# Patient Record
Sex: FEMALE | Race: WHITE | NOT HISPANIC OR LATINO | URBAN - METROPOLITAN AREA
[De-identification: names, ages, dates, MRNs, and addresses within clinical notes are randomized per-mention and may not be internally consistent; named-entity substitution may affect disease eponyms.]

---

## 2022-11-24 ENCOUNTER — EMERGENCY (EMERGENCY)
Facility: HOSPITAL | Age: 52
LOS: 0 days | Discharge: HOME | End: 2022-11-24
Attending: EMERGENCY MEDICINE | Admitting: EMERGENCY MEDICINE

## 2022-11-24 VITALS
DIASTOLIC BLOOD PRESSURE: 90 MMHG | SYSTOLIC BLOOD PRESSURE: 148 MMHG | RESPIRATION RATE: 17 BRPM | TEMPERATURE: 100 F | HEART RATE: 112 BPM | WEIGHT: 210.1 LBS | OXYGEN SATURATION: 98 %

## 2022-11-24 VITALS
TEMPERATURE: 99 F | SYSTOLIC BLOOD PRESSURE: 140 MMHG | DIASTOLIC BLOOD PRESSURE: 84 MMHG | RESPIRATION RATE: 17 BRPM | OXYGEN SATURATION: 96 % | HEART RATE: 98 BPM

## 2022-11-24 DIAGNOSIS — J06.9 ACUTE UPPER RESPIRATORY INFECTION, UNSPECIFIED: ICD-10-CM

## 2022-11-24 DIAGNOSIS — R05.1 ACUTE COUGH: ICD-10-CM

## 2022-11-24 DIAGNOSIS — R50.9 FEVER, UNSPECIFIED: ICD-10-CM

## 2022-11-24 DIAGNOSIS — R09.81 NASAL CONGESTION: ICD-10-CM

## 2022-11-24 DIAGNOSIS — Z87.448 PERSONAL HISTORY OF OTHER DISEASES OF URINARY SYSTEM: ICD-10-CM

## 2022-11-24 DIAGNOSIS — R06.2 WHEEZING: ICD-10-CM

## 2022-11-24 PROCEDURE — 99284 EMERGENCY DEPT VISIT MOD MDM: CPT

## 2022-11-24 PROCEDURE — 71045 X-RAY EXAM CHEST 1 VIEW: CPT | Mod: 26

## 2022-11-24 RX ORDER — DEXAMETHASONE 0.5 MG/5ML
10 ELIXIR ORAL ONCE
Refills: 0 | Status: COMPLETED | OUTPATIENT
Start: 2022-11-24 | End: 2022-11-24

## 2022-11-24 RX ORDER — IPRATROPIUM/ALBUTEROL SULFATE 18-103MCG
3 AEROSOL WITH ADAPTER (GRAM) INHALATION ONCE
Refills: 0 | Status: COMPLETED | OUTPATIENT
Start: 2022-11-24 | End: 2022-11-24

## 2022-11-24 RX ORDER — ACETAMINOPHEN 500 MG
975 TABLET ORAL ONCE
Refills: 0 | Status: COMPLETED | OUTPATIENT
Start: 2022-11-24 | End: 2022-11-24

## 2022-11-24 RX ADMIN — Medication 3 MILLILITER(S): at 13:54

## 2022-11-24 NOTE — ED PROVIDER NOTE - CLINICAL SUMMARY MEDICAL DECISION MAKING FREE TEXT BOX
52-year-old female presenting to ED for cough concerning for viral illness.  Chest x-ray within normal limits.  Lungs clear to auscultation.  COVID flu test pending.  DC him strict return precautions.

## 2022-11-24 NOTE — ED PROVIDER NOTE - NSFOLLOWUPINSTRUCTIONS_ED_ALL_ED_FT
Continue your antibiotics until finished.     Fever    A fever is an increase in the body's temperature above 100.4°F (38°C) or higher. In adults and children older than three months, a brief mild or moderate fever generally has no long-term effect, and it usually does not require treatment. Many times, fevers are the result of viral infections, which are self-resolving.  However, certain symptoms or diagnostic tests may suggest a bacterial infection that may respond to antibiotics. Take medications as directed by your health care provider.    SEEK IMMEDIATE MEDICAL CARE IF YOU OR YOUR CHILD HAVE ANY OF THE FOLLOWING SYMPTOMS : shortness of breath, seizure, rash/stiff neck/headache, severe abdominal pain, persistent vomiting, any signs of dehydration, or if your child has a fever for over five (5) days.    Shortness of breath    Shortness of breath (dyspnea) means you have trouble breathing and could indicate a medical problem. Causes include lung disease, heart disease, low amount of red blood cells (anemia), poor physical fitness, being overweight, smoking, etc. Your health care provider today may not be able to find a cause for your shortness of breath after your exam. In this case, it is important to have a follow-up exam with your primary care physician as instructed. If medicines were prescribed, take them as directed for the full length of time directed. Refrain from tobacco products.    SEEK IMMEDIATE MEDICAL CARE IF YOU HAVE ANY OF THE FOLLOWING SYMPTOMS: worsening shortness of breath, chest pain, back pain, abdominal pain, fever, coughing up blood, lightheadedness/dizziness.

## 2022-11-24 NOTE — ED PROVIDER NOTE - PATIENT PORTAL LINK FT
You can access the FollowMyHealth Patient Portal offered by [facility] by registering at the following website: [email or web address]. By joining [facility]’s FollowMyHealth portal, you will also be able to view your health information using other applications (apps) compatible with our system.

## 2022-11-24 NOTE — ED PROVIDER NOTE - PHYSICAL EXAMINATION
CONSTITUTIONAL: nontoxic appearing, in no acute distress  HEAD:  normocephalic, atraumatic  EYES:  no conjunctival injection, no eye discharge, tracking well  ENT:  tympanic membranes intact bilaterally, moist mucous membranes, no oropharyngeal ulcerations or lesions, no tonsillar swelling or erythema, no tonsillar exudates  NECK:  supple, no masses, no tender anterior/posterior cervical lymphadenopathy  CV:  regular rate and rhythm, cap refill < 2 seconds  RESP:  normal respiratory effort, mild end expiratory wheezes in apices b/l; otherwise clear exam   ABD:  soft, nontender, nondistended, no masses, no organomegaly  LYMPH:  no significant lymphadenopathy  MSK/NEURO:  normal movement, normal tone  SKIN:  warm, dry, no rash

## 2022-11-24 NOTE — ED PROVIDER NOTE - ATTENDING CONTRIBUTION TO CARE
52 y F presents to ED for fever cough and congestion. Pt went to urgent care for these symptoms 11 days ago and received amoxicillin. Pt was still having symptoms so she went to her PMD who changed her antibiotics to Augmentin however she states she is still coughing. No CP, nausea, vomiting, abdominal pain, diarrhea.     Const: NAD  Eyes: PERRL, no conjunctival injection  HENT:  Neck supple without meningismus   CV: RRR, Warm, well-perfused extremities  RESP: CTA B/L, no tachypnea   GI: soft, non-tender, non-distended  MSK: No gross deformities appreciated  Skin: Warm, dry. No rashes  Neuro: Alert, CNs II-XII grossly intact. Sensation and motor function of extremities grossly intact.  Psych: Appropriate mood and affect.

## 2022-11-24 NOTE — ED PROVIDER NOTE - OBJECTIVE STATEMENT
52 y F Nepalese speaking only pmhx of atrophic kidney 2/2 previous infection c/o fever, cough, congestion. pt states that since Sunday (11d ago) she has had cough, congestion, and fever; pt went to UC and was prescribed abx but felt that after 4d she was not significantly improving; pt states that she went back to PMD and had her abx changed to augmentin; pt has been compliant w/ medications but states that her cough has persisted. pt denies sob, cp, difficulty tolerating PO.

## 2024-04-24 PROBLEM — Z00.00 ENCOUNTER FOR PREVENTIVE HEALTH EXAMINATION: Status: ACTIVE | Noted: 2024-04-24

## 2024-05-20 ENCOUNTER — APPOINTMENT (OUTPATIENT)
Dept: ORTHOPEDIC SURGERY | Facility: CLINIC | Age: 54
End: 2024-05-20
Payer: COMMERCIAL

## 2024-05-20 VITALS — WEIGHT: 210 LBS | BODY MASS INDEX: 32.96 KG/M2 | HEIGHT: 67 IN

## 2024-05-20 DIAGNOSIS — Z78.9 OTHER SPECIFIED HEALTH STATUS: ICD-10-CM

## 2024-05-20 DIAGNOSIS — M17.32 UNILATERAL POST-TRAUMATIC OSTEOARTHRITIS, LEFT KNEE: ICD-10-CM

## 2024-05-20 DIAGNOSIS — M17.11 UNILATERAL PRIMARY OSTEOARTHRITIS, RIGHT KNEE: ICD-10-CM

## 2024-05-20 DIAGNOSIS — I10 ESSENTIAL (PRIMARY) HYPERTENSION: ICD-10-CM

## 2024-05-20 PROCEDURE — 73522 X-RAY EXAM HIPS BI 3-4 VIEWS: CPT

## 2024-05-20 PROCEDURE — 73564 X-RAY EXAM KNEE 4 OR MORE: CPT | Mod: 50

## 2024-05-20 PROCEDURE — 99203 OFFICE O/P NEW LOW 30 MIN: CPT

## 2024-05-20 RX ORDER — HYALURONATE SODIUM 16.8MG/2ML
16.8 SYRINGE (ML) INTRAARTICULAR
Qty: 6 | Refills: 0 | Status: COMPLETED | COMMUNITY
Start: 2024-05-20 | End: 2024-05-26

## 2024-05-20 NOTE — PAST MEDICAL HISTORY
[TextEntry] : Chronic renal insufficiency.  It stems from congenital issues as well as history of infection of the kidney.

## 2024-05-20 NOTE — HISTORY OF PRESENT ILLNESS
[Constant] : ~He/She~ states the symptoms seem to be constant [Bending] : worsened by bending [Walking] : worsened by walking [Recumbency] : relieved by recumbency [Rest] : relieved by rest [de-identified] : 53-year-old lady presents for evaluation of pain in her knees, more pronounced on the left side.  She reports constant pain associated with loss of motion.  Her changes in gait also aggravate pain in the right knee and both hips.  She reports the pain gets worse with sitting, standing, and stairs as well as walking.  Rest and extending the leg sometimes make it better, but even at rest pain can be severe.  She rates pain as severe also with walking and at night.  She can walk 6-10 blocks with a cane and has difficulty putting on shoes and socks.  She has no problems using public transportation.  She has pain going up and down the stairs, and sometimes with sitting down pain started immediately.  She has been doing physical therapy, and had multiple courses of injection.  She had both hyaluronic acid and steroid injections.  Last steroid injection was done in November 2023.  She has history of arthroscopic meniscectomy of the left knee in 2015. [de-identified] : standing

## 2024-05-20 NOTE — DISCUSSION/SUMMARY
[de-identified] : All these findings were discussed with Ms. Rowejosue. We discussed her x-ray and severe osteoarthritis.  At this point she is not fully ready for total knee arthroplasty.  She will try another course of viscosupplementation, given that she had improvement in the past.  Injections were ordered, and patient will follow-up when viscosupplementation is delivered.

## 2024-05-20 NOTE — PHYSICAL EXAM
[de-identified] : Patient walks with significant limp.  Right knee demonstrates full range of motion from full extension to 120 degrees of flexion.  There is no instability.  There is crepitus on patellar grind test.  There is tenderness to palpation over the patellofemoral articulation and medial joint line. Left knee demonstrates severe crepitus on patellar grind test.  There is significant global tenderness palpation.  There is a 15 degree flexion contracture and patient cannot flex beyond 110 degrees.  There is no instability.  Varus is more pronounced on the left side and is only partially correctable.  There is slight effusion.  Hip exam is unremarkable bilaterally.  I [de-identified] :  bilateral hip and knee xrays were obtained in office today show: No significant osteoarthritis of bilateral hips. Right knee demonstrates significant osteoarthritic changes with decrease of joint space medially.  There is osteophyte formation. Left knee demonstrates severe osteoarthritis.  There is complete loss of joint space in the medial compartment with bone-on-bone articulation.  There is significant osteophyte formation also involving the lateral compartment, and patellofemoral compartment.

## 2024-07-26 ENCOUNTER — APPOINTMENT (OUTPATIENT)
Dept: ORTHOPEDIC SURGERY | Facility: CLINIC | Age: 54
End: 2024-07-26
Payer: COMMERCIAL

## 2024-07-26 PROCEDURE — 20610 DRAIN/INJ JOINT/BURSA W/O US: CPT | Mod: 50

## 2024-07-26 NOTE — PROCEDURE
[de-identified] :   Discussed again with the patient the planned orthovisc injection. The risks, benefits, convalescence and alternatives were reviewed. The possible side effects discussed included but were not limited to pain, swelling, heat and redness. There symptoms are generally mild but if they are extensive then contact the office.   After discussion of the risks and benefits, the patient agreed to their orthovisc injection into BILATERAL knees. Confirmed that the patient does not have a history of prior adverse reactions, active infections or relevant allergies. There was no effusion, erythema or warmth present, and the skin was clear. The skin was sterilized with alcohol. Topical anesthesia was achieved with ethyl chloride. Chlorhexidine was applied. A 22-gauge needle was inserted into the RIGHT knee via a lateral approach without ultrasound guidance. The injection was completed without complication and a bandage was applied.  The same procedure was repeated for the LEFT side.   The patient tolerated the procedure well and was instructed to avoid strenuous activities for the next 24-48 hours and to use ice, NSAIDs, or Tylenol for pain as needed. The patient will call immediately with any signs of infection or allergic reaction. LOT:[id number] EXP:06/06/2026

## 2024-08-02 ENCOUNTER — APPOINTMENT (OUTPATIENT)
Dept: ORTHOPEDIC SURGERY | Facility: CLINIC | Age: 54
End: 2024-08-02
Payer: COMMERCIAL

## 2024-08-02 PROCEDURE — 20610 DRAIN/INJ JOINT/BURSA W/O US: CPT | Mod: 50

## 2024-08-02 NOTE — PROCEDURE
[de-identified] :   Discussed again with the patient the planned orthovisc injection. The risks, benefits, convalescence and alternatives were reviewed. The possible side effects discussed included but were not limited to pain, swelling, heat and redness. There symptoms are generally mild but if they are extensive then contact the office.    After discussion of the risks and benefits, the patient agreed to their orthovisc injection into BILATERAL knees. Confirmed that the patient does not have a history of prior adverse reactions, active infections or relevant allergies. There was no effusion, erythema or warmth present, and the skin was clear. The skin was sterilized with alcohol. Topical anesthesia was achieved with ethyl chloride. Chlorhexidine was applied. A 22-gauge needle was inserted into the RIGHT knee via a lateral approach without ultrasound guidance. The injection was completed without complication and a bandage was applied.  The same procedure was repeated for the LEFT side.   The patient tolerated the procedure well and was instructed to avoid strenuous activities for the next 24-48 hours and to use ice, NSAIDs, or Tylenol for pain as needed. The patient will call immediately with any signs of infection or allergic reaction.   LOT:[id number] EXP: 06/06/2026

## 2024-08-09 ENCOUNTER — APPOINTMENT (OUTPATIENT)
Dept: ORTHOPEDIC SURGERY | Facility: CLINIC | Age: 54
End: 2024-08-09

## 2024-08-09 PROCEDURE — 20610 DRAIN/INJ JOINT/BURSA W/O US: CPT | Mod: 50

## 2024-08-09 NOTE — PROCEDURE
[de-identified] : Discussed again with the patient the planned orthovisc injection. The risks, benefits, convalescence and alternatives were reviewed. The possible side effects discussed included but were not limited to pain, swelling, heat and redness. There symptoms are generally mild but if they are extensive then contact the office.  After discussion of the risks and benefits, the patient agreed to their orthovisc injection into BILATERAL knees. Confirmed that the patient does not have a history of prior adverse reactions, active infections or relevant allergies. There was no effusion, erythema or warmth present, and the skin was clear. The skin was sterilized with alcohol. Topical anesthesia was achieved with ethyl chloride. Chlorhexidine was applied. A 22-gauge needle was inserted into the RIGHT knee via a lateral approach without ultrasound guidance. The injection was completed without complication and a bandage was applied.  The same procedure was repeated for the LEFT side.  The patient tolerated the procedure well and was instructed to avoid strenuous activities for the next 24-48 hours and to use ice, NSAIDs, or Tylenol for pain as needed. The patient will call immediately with any signs of infection or allergic reaction. LOT          [id number]          EXP:  2026-06-06

## 2024-08-09 NOTE — PROCEDURE
[de-identified] : Discussed again with the patient the planned orthovisc injection. The risks, benefits, convalescence and alternatives were reviewed. The possible side effects discussed included but were not limited to pain, swelling, heat and redness. There symptoms are generally mild but if they are extensive then contact the office.  After discussion of the risks and benefits, the patient agreed to their orthovisc injection into BILATERAL knees. Confirmed that the patient does not have a history of prior adverse reactions, active infections or relevant allergies. There was no effusion, erythema or warmth present, and the skin was clear. The skin was sterilized with alcohol. Topical anesthesia was achieved with ethyl chloride. Chlorhexidine was applied. A 22-gauge needle was inserted into the RIGHT knee via a lateral approach without ultrasound guidance. The injection was completed without complication and a bandage was applied.  The same procedure was repeated for the LEFT side.  The patient tolerated the procedure well and was instructed to avoid strenuous activities for the next 24-48 hours and to use ice, NSAIDs, or Tylenol for pain as needed. The patient will call immediately with any signs of infection or allergic reaction. LOT          [id number]          EXP:  2026-06-06

## 2025-02-25 ENCOUNTER — LABORATORY RESULT (OUTPATIENT)
Age: 55
End: 2025-02-25

## 2025-02-25 ENCOUNTER — APPOINTMENT (OUTPATIENT)
Dept: UROLOGY | Facility: CLINIC | Age: 55
End: 2025-02-25
Payer: COMMERCIAL

## 2025-02-25 VITALS
DIASTOLIC BLOOD PRESSURE: 113 MMHG | RESPIRATION RATE: 18 BRPM | BODY MASS INDEX: 31.39 KG/M2 | HEIGHT: 67 IN | SYSTOLIC BLOOD PRESSURE: 177 MMHG | HEART RATE: 79 BPM | WEIGHT: 200 LBS | OXYGEN SATURATION: 79 %

## 2025-02-25 DIAGNOSIS — N26.1 ATROPHY OF KIDNEY (TERMINAL): ICD-10-CM

## 2025-02-25 DIAGNOSIS — N20.0 CALCULUS OF KIDNEY: ICD-10-CM

## 2025-02-25 DIAGNOSIS — R10.2 PELVIC AND PERINEAL PAIN: ICD-10-CM

## 2025-02-25 PROCEDURE — G2211 COMPLEX E/M VISIT ADD ON: CPT | Mod: NC

## 2025-02-25 PROCEDURE — 99204 OFFICE O/P NEW MOD 45 MIN: CPT

## 2025-02-25 RX ORDER — PHENAZOPYRIDINE HYDROCHLORIDE 200 MG/1
200 TABLET ORAL 3 TIMES DAILY
Qty: 21 | Refills: 1 | Status: ACTIVE | COMMUNITY
Start: 2025-02-25 | End: 1900-01-01

## 2025-02-27 ENCOUNTER — NON-APPOINTMENT (OUTPATIENT)
Age: 55
End: 2025-02-27

## 2025-03-06 ENCOUNTER — NON-APPOINTMENT (OUTPATIENT)
Age: 55
End: 2025-03-06

## 2025-03-06 LAB — BACTERIA UR CULT: ABNORMAL

## 2025-04-05 ENCOUNTER — RESULT REVIEW (OUTPATIENT)
Age: 55
End: 2025-04-05

## 2025-04-05 ENCOUNTER — OUTPATIENT (OUTPATIENT)
Dept: OUTPATIENT SERVICES | Facility: HOSPITAL | Age: 55
LOS: 1 days | End: 2025-04-05
Payer: COMMERCIAL

## 2025-04-05 DIAGNOSIS — N20.0 CALCULUS OF KIDNEY: ICD-10-CM

## 2025-04-05 DIAGNOSIS — Z00.8 ENCOUNTER FOR OTHER GENERAL EXAMINATION: ICD-10-CM

## 2025-04-05 PROCEDURE — 74176 CT ABD & PELVIS W/O CONTRAST: CPT | Mod: 26

## 2025-04-05 PROCEDURE — 74176 CT ABD & PELVIS W/O CONTRAST: CPT

## 2025-04-06 DIAGNOSIS — N20.0 CALCULUS OF KIDNEY: ICD-10-CM

## 2025-04-10 ENCOUNTER — APPOINTMENT (OUTPATIENT)
Dept: UROLOGY | Facility: CLINIC | Age: 55
End: 2025-04-10
Payer: COMMERCIAL

## 2025-04-10 DIAGNOSIS — R82.90 UNSPECIFIED ABNORMAL FINDINGS IN URINE: ICD-10-CM

## 2025-04-10 DIAGNOSIS — N26.1 ATROPHY OF KIDNEY (TERMINAL): ICD-10-CM

## 2025-04-10 DIAGNOSIS — N20.0 CALCULUS OF KIDNEY: ICD-10-CM

## 2025-04-10 PROCEDURE — 81003 URINALYSIS AUTO W/O SCOPE: CPT | Mod: QW

## 2025-04-10 PROCEDURE — 99214 OFFICE O/P EST MOD 30 MIN: CPT | Mod: 25

## 2025-04-15 ENCOUNTER — NON-APPOINTMENT (OUTPATIENT)
Age: 55
End: 2025-04-15

## 2025-04-15 LAB
BILIRUB UR QL STRIP: NORMAL
COLLECTION METHOD: NORMAL
GLUCOSE UR-MCNC: NORMAL
HCG UR QL: 0.2 EU/DL
HGB UR QL STRIP.AUTO: NORMAL
KETONES UR-MCNC: NORMAL
LEUKOCYTE ESTERASE UR QL STRIP: NORMAL
NITRITE UR QL STRIP: NORMAL
PH UR STRIP: 6
PROT UR STRIP-MCNC: NORMAL
SP GR UR STRIP: 1.01

## 2025-05-16 ENCOUNTER — APPOINTMENT (OUTPATIENT)
Dept: ORTHOPEDIC SURGERY | Facility: CLINIC | Age: 55
End: 2025-05-16
Payer: COMMERCIAL

## 2025-05-16 DIAGNOSIS — M17.32 UNILATERAL POST-TRAUMATIC OSTEOARTHRITIS, LEFT KNEE: ICD-10-CM

## 2025-05-16 PROCEDURE — 73564 X-RAY EXAM KNEE 4 OR MORE: CPT | Mod: LT

## 2025-05-16 PROCEDURE — 99214 OFFICE O/P EST MOD 30 MIN: CPT | Mod: 57

## 2025-05-21 ENCOUNTER — NON-APPOINTMENT (OUTPATIENT)
Age: 55
End: 2025-05-21

## 2025-06-04 ENCOUNTER — RESULT REVIEW (OUTPATIENT)
Age: 55
End: 2025-06-04

## 2025-06-04 ENCOUNTER — OUTPATIENT (OUTPATIENT)
Dept: OUTPATIENT SERVICES | Facility: HOSPITAL | Age: 55
LOS: 1 days | End: 2025-06-04
Payer: COMMERCIAL

## 2025-06-04 VITALS
SYSTOLIC BLOOD PRESSURE: 149 MMHG | RESPIRATION RATE: 18 BRPM | HEIGHT: 72 IN | HEART RATE: 65 BPM | OXYGEN SATURATION: 98 % | DIASTOLIC BLOOD PRESSURE: 91 MMHG | TEMPERATURE: 98 F | WEIGHT: 214.95 LBS

## 2025-06-04 DIAGNOSIS — M17.12 UNILATERAL PRIMARY OSTEOARTHRITIS, LEFT KNEE: ICD-10-CM

## 2025-06-04 DIAGNOSIS — Z98.890 OTHER SPECIFIED POSTPROCEDURAL STATES: Chronic | ICD-10-CM

## 2025-06-04 DIAGNOSIS — Z01.818 ENCOUNTER FOR OTHER PREPROCEDURAL EXAMINATION: ICD-10-CM

## 2025-06-04 LAB
A1C WITH ESTIMATED AVERAGE GLUCOSE RESULT: 6.1 % — HIGH (ref 4–5.6)
ALBUMIN SERPL ELPH-MCNC: 4.5 G/DL — SIGNIFICANT CHANGE UP (ref 3.5–5.2)
ALP SERPL-CCNC: 113 U/L — SIGNIFICANT CHANGE UP (ref 30–115)
ALT FLD-CCNC: 14 U/L — SIGNIFICANT CHANGE UP (ref 0–41)
ANION GAP SERPL CALC-SCNC: 10 MMOL/L — SIGNIFICANT CHANGE UP (ref 7–14)
APTT BLD: 30.7 SEC — SIGNIFICANT CHANGE UP (ref 27–39.2)
AST SERPL-CCNC: 23 U/L — SIGNIFICANT CHANGE UP (ref 0–41)
BASOPHILS # BLD AUTO: 0.04 K/UL — SIGNIFICANT CHANGE UP (ref 0–0.2)
BASOPHILS NFR BLD AUTO: 0.8 % — SIGNIFICANT CHANGE UP (ref 0–1)
BILIRUB SERPL-MCNC: 0.6 MG/DL — SIGNIFICANT CHANGE UP (ref 0.2–1.2)
BLD GP AB SCN SERPL QL: SIGNIFICANT CHANGE UP
BUN SERPL-MCNC: 14 MG/DL — SIGNIFICANT CHANGE UP (ref 10–20)
CALCIUM SERPL-MCNC: 9.6 MG/DL — SIGNIFICANT CHANGE UP (ref 8.4–10.5)
CHLORIDE SERPL-SCNC: 105 MMOL/L — SIGNIFICANT CHANGE UP (ref 98–110)
CO2 SERPL-SCNC: 25 MMOL/L — SIGNIFICANT CHANGE UP (ref 17–32)
CREAT SERPL-MCNC: 0.9 MG/DL — SIGNIFICANT CHANGE UP (ref 0.7–1.5)
EGFR: 76 ML/MIN/1.73M2 — SIGNIFICANT CHANGE UP
EGFR: 76 ML/MIN/1.73M2 — SIGNIFICANT CHANGE UP
EOSINOPHIL # BLD AUTO: 0.17 K/UL — SIGNIFICANT CHANGE UP (ref 0–0.7)
EOSINOPHIL NFR BLD AUTO: 3.5 % — SIGNIFICANT CHANGE UP (ref 0–8)
ESTIMATED AVERAGE GLUCOSE: 128 MG/DL — HIGH (ref 68–114)
GLUCOSE SERPL-MCNC: 115 MG/DL — HIGH (ref 70–99)
HCT VFR BLD CALC: 40 % — SIGNIFICANT CHANGE UP (ref 37–47)
HGB BLD-MCNC: 13.3 G/DL — SIGNIFICANT CHANGE UP (ref 12–16)
IMM GRANULOCYTES NFR BLD AUTO: 0.2 % — SIGNIFICANT CHANGE UP (ref 0.1–0.3)
INR BLD: 0.88 RATIO — SIGNIFICANT CHANGE UP (ref 0.65–1.3)
LYMPHOCYTES # BLD AUTO: 1.82 K/UL — SIGNIFICANT CHANGE UP (ref 1.2–3.4)
LYMPHOCYTES # BLD AUTO: 37.4 % — SIGNIFICANT CHANGE UP (ref 20.5–51.1)
MCHC RBC-ENTMCNC: 29.4 PG — SIGNIFICANT CHANGE UP (ref 27–31)
MCHC RBC-ENTMCNC: 33.3 G/DL — SIGNIFICANT CHANGE UP (ref 32–37)
MCV RBC AUTO: 88.5 FL — SIGNIFICANT CHANGE UP (ref 81–99)
MONOCYTES # BLD AUTO: 0.41 K/UL — SIGNIFICANT CHANGE UP (ref 0.1–0.6)
MONOCYTES NFR BLD AUTO: 8.4 % — SIGNIFICANT CHANGE UP (ref 1.7–9.3)
MRSA PCR RESULT.: NEGATIVE — SIGNIFICANT CHANGE UP
NEUTROPHILS # BLD AUTO: 2.41 K/UL — SIGNIFICANT CHANGE UP (ref 1.4–6.5)
NEUTROPHILS NFR BLD AUTO: 49.7 % — SIGNIFICANT CHANGE UP (ref 42.2–75.2)
NRBC BLD AUTO-RTO: 0 /100 WBCS — SIGNIFICANT CHANGE UP (ref 0–0)
PLATELET # BLD AUTO: 199 K/UL — SIGNIFICANT CHANGE UP (ref 130–400)
PMV BLD: 10.6 FL — HIGH (ref 7.4–10.4)
POTASSIUM SERPL-MCNC: 4.2 MMOL/L — SIGNIFICANT CHANGE UP (ref 3.5–5)
POTASSIUM SERPL-SCNC: 4.2 MMOL/L — SIGNIFICANT CHANGE UP (ref 3.5–5)
PROT SERPL-MCNC: 6.9 G/DL — SIGNIFICANT CHANGE UP (ref 6–8)
PROTHROM AB SERPL-ACNC: 10.3 SEC — SIGNIFICANT CHANGE UP (ref 9.95–12.87)
RBC # BLD: 4.52 M/UL — SIGNIFICANT CHANGE UP (ref 4.2–5.4)
RBC # FLD: 12.3 % — SIGNIFICANT CHANGE UP (ref 11.5–14.5)
SODIUM SERPL-SCNC: 140 MMOL/L — SIGNIFICANT CHANGE UP (ref 135–146)
WBC # BLD: 4.86 K/UL — SIGNIFICANT CHANGE UP (ref 4.8–10.8)
WBC # FLD AUTO: 4.86 K/UL — SIGNIFICANT CHANGE UP (ref 4.8–10.8)

## 2025-06-04 PROCEDURE — 73562 X-RAY EXAM OF KNEE 3: CPT | Mod: 26,LT

## 2025-06-04 PROCEDURE — 99214 OFFICE O/P EST MOD 30 MIN: CPT | Mod: 25

## 2025-06-04 PROCEDURE — 86901 BLOOD TYPING SEROLOGIC RH(D): CPT

## 2025-06-04 PROCEDURE — 85025 COMPLETE CBC W/AUTO DIFF WBC: CPT

## 2025-06-04 PROCEDURE — 73562 X-RAY EXAM OF KNEE 3: CPT | Mod: LT

## 2025-06-04 PROCEDURE — 85730 THROMBOPLASTIN TIME PARTIAL: CPT

## 2025-06-04 PROCEDURE — 83036 HEMOGLOBIN GLYCOSYLATED A1C: CPT

## 2025-06-04 PROCEDURE — 86850 RBC ANTIBODY SCREEN: CPT

## 2025-06-04 PROCEDURE — 80053 COMPREHEN METABOLIC PANEL: CPT

## 2025-06-04 PROCEDURE — 36415 COLL VENOUS BLD VENIPUNCTURE: CPT

## 2025-06-04 PROCEDURE — 93005 ELECTROCARDIOGRAM TRACING: CPT

## 2025-06-04 PROCEDURE — 85610 PROTHROMBIN TIME: CPT

## 2025-06-04 PROCEDURE — 93010 ELECTROCARDIOGRAM REPORT: CPT

## 2025-06-04 PROCEDURE — 72170 X-RAY EXAM OF PELVIS: CPT | Mod: 26

## 2025-06-04 PROCEDURE — 86900 BLOOD TYPING SEROLOGIC ABO: CPT

## 2025-06-04 PROCEDURE — 72170 X-RAY EXAM OF PELVIS: CPT

## 2025-06-04 PROCEDURE — 87641 MR-STAPH DNA AMP PROBE: CPT

## 2025-06-04 PROCEDURE — 87640 STAPH A DNA AMP PROBE: CPT

## 2025-06-04 RX ORDER — ACETAMINOPHEN 500 MG/5ML
1000 LIQUID (ML) ORAL ONCE
Refills: 0 | Status: COMPLETED | OUTPATIENT
Start: 2025-06-19 | End: 2025-06-19

## 2025-06-04 RX ORDER — CELECOXIB 50 MG/1
400 CAPSULE ORAL ONCE
Refills: 0 | Status: COMPLETED | OUTPATIENT
Start: 2025-06-19 | End: 2025-06-19

## 2025-06-04 NOTE — H&P PST ADULT - NSANTHOSAYNRD_GEN_A_CORE
No. KAMRAN screening performed.  STOP BANG Legend: 0-2 = LOW Risk; 3-4 = INTERMEDIATE Risk; 5-8 = HIGH Risk

## 2025-06-04 NOTE — H&P PST ADULT - MUSCULOSKELETAL
ROM intact/normal gait/strength 5/5 bilateral upper extremities/strength 5/5 bilateral lower extremities ROM intact/decreased ROM due to pain/strength 5/5 bilateral upper extremities

## 2025-06-04 NOTE — H&P PST ADULT - HISTORY OF PRESENT ILLNESS
PATIENT CURRENTLY DENIES CHEST PAIN  SHORTNESS OF BREATH  PALPITATIONS,  DYSURIA, OR UPPER RESPIRATORY INFECTION IN PAST 2 WEEKS  54 YEARS OLD FEMALE C/O RIGHT KNEE PAIN, PAIN IS SELF LIMITING, HAS BEEN TREATED WITH MEDICATIONS, PT BUT DID NOT HELP, HERE TODAY FOR SCHEDULED SURGERY ABOVE.    Denies travel outside the [place] in the past 30 days  Patient denies any signs or symptoms of COVID 19 and denies contact with known positive individuals.         Anesthesia Alert  YES--Difficult Airway CLASS IV  NO--History of neck surgery or radiation  NO--Limited ROM of neck  NO--History of Malignant hyperthermia  NO--No personal or family history of Pseudocholinesterase deficiency.  NO--Prior Anesthesia Complication  NO--Latex Allergy  NO--Loose teeth  NO--History of Rheumatoid Arthritis  NO--Bleeding risk  NO--KAMRAN  NO--Other_____    DASI  5.29    RCRI 0    PT DENIES ANY RASHES, ABRASION, OR OPEN WOUNDS OR CUTS    AS PER THE PT, THIS IS HIS/HER COMPLETE MEDICAL AND SURGICAL HX, INCLUDING MEDICATIONS PRESCRIBED AND OVER THE COUNTER    Patient/Guardian understands the instructions and was given the opportunity to ask questions and have them answered.    Opioid Risk Assessment Tool (Female)       Family history of substance abuse            Alcohol (1)  0            Illegal Drugs (2)  0            Prescription drugs (4)  0       Personal history of substance abuse            Alcohol (3)  0            Illegal Drugs (4)  0            Prescription drugs (5)  0       Age between 16-45 (1)  0  al abuse (3)  0       Psychological disease (ADD, ADHD, OCD, Bipolar Disorder, Schizophrenia, Depression) (2)  0    Scoring Totals: 0   Low Risk (0-3)  Moderate Risk (4-7)  High Risk (>/=8)    pt denies any suicidal ideation or thoughts, pt states not a threat to self or others    The patient confirms they have received, reviewed, and understood their preoperative spine education material.  In the event of any questions or concerns leading up to the surgery, the patient is aware of how to contact the surgeon's office or the [facility] PROGRAM.             PATIENT CURRENTLY DENIES CHEST PAIN  SHORTNESS OF BREATH  PALPITATIONS,  DYSURIA, OR UPPER RESPIRATORY INFECTION IN PAST 2 WEEKS  54 YEARS OLD FEMALE C/O RIGHT KNEE PAIN, PAIN IS SELF LIMITING, HAS BEEN TREATED WITH MEDICATIONS, PT BUT DID NOT HELP, HERE TODAY FOR SCHEDULED SURGERY ABOVE. PATIENT SEES DR RUIZ EVALUATION OF NEPHROLITHIASIS, HX PEDIATRIC UROLOGIC SURGERY, RIGHT URETERAL RECONSTRUCTION, RIGHT ATROPHIC KIDNEY AND LEFT NON-OBSTRUCTING NEPHROLITHIASIS.    Den[place] travel outside the [place] in the past 30 days  Patient denies any signs or symptoms of COVID 19 and denies contact with known positive individuals.         Anesthesia Alert  YES--Difficult Airway CLASS IV  NO--History of neck surgery or radiation  NO--Limited ROM of neck  NO--History of Malignant hyperthermia  NO--No personal or family history of Pseudocholinesterase deficiency.  NO--Prior Anesthesia Complication  NO--Latex Allergy  NO--Loose teeth  NO--History of Rheumatoid Arthritis  NO--Bleeding risk  NO--KAMRAN  NO--Other_____    DASI  5.29    RCRI 0    PT DENIES ANY RASHES, ABRASION, OR OPEN WOUNDS OR CUTS    AS PER THE PT, THIS IS HIS/HER COMPLETE MEDICAL AND SURGICAL HX, INCLUDING MEDICATIONS PRESCRIBED AND OVER THE COUNTER    Patient/Guardian understands the instructions and was given the opportunity to ask questions and have them answered.    Opioid Risk Assessment Tool (Female)       Family history of substance abuse            Alcohol (1)  0            Illegal Drugs (2)  0            Prescription drugs (4)  0       Personal history of substance abuse            Alcohol (3)  0            Illegal Drugs (4)  0            Prescription drugs (5)  0       Age between 16-45 (1)  0  al abuse (3)  0       Psychological disease (ADD, ADHD, OCD, Bipolar Disorder, Schizophrenia, Depression) (2)  0    Scoring Totals: 0   Low Risk (0-3)  Moderate Risk (4-7)  High Risk (>/=8)    pt denies any suicidal ideation or thoughts, pt states not a threat to self or others    The patient confirms they have received, reviewed, and understood their preoperative spine education material.  In the event of any questions or concerns leading up to the surgery, the patient is aware of how to contact the surgeon's office or the [facility] PROGRAM.             PATIENT CURRENTLY DENIES CHEST PAIN  SHORTNESS OF BREATH  PALPITATIONS,  DYSURIA, OR UPPER RESPIRATORY INFECTION IN PAST 2 WEEKS  54 YEARS OLD FEMALE C/O RIGHT KNEE PAIN, PAIN IS SELF LIMITING, HAS BEEN TREATED WITH MEDICATIONS, PT BUT DID NOT HELP, HERE TODAY FOR SCHEDULED SURGERY ABOVE. PATIENT SEES DR RUIZ EVALUATION OF NEPHROLITHIASIS, HX PEDIATRIC UROLOGIC SURGERY, RIGHT URETERAL RECONSTRUCTION, RIGHT ATROPHIC KIDNEY AND LEFT NON-OBSTRUCTING NEPHROLITHIASIS.    Denies travel outside the [place] in the past 30 days  Patient denies any signs or symptoms of COVID 19 and denies contact with known positive individuals.         Anesthesia Alert  YES--Difficult Airway CLASS IV  NO--History of neck surgery or radiation  NO--Limited ROM of neck  NO--History of Malignant hyperthermia  NO--No personal or family history of Pseudocholinesterase deficiency.  NO--Prior Anesthesia Complication  NO--Latex Allergy  NO--Loose teeth  NO--History of Rheumatoid Arthritis  NO--Bleeding risk  NO--KAMRAN  NO--Other_____    DASI  5.29    RCRI 0    PT DENIES ANY RASHES, ABRASION, OR OPEN WOUNDS OR CUTS    AS PER THE PT, THIS IS HIS/HER COMPLETE MEDICAL AND SURGICAL HX, INCLUDING MEDICATIONS PRESCRIBED AND OVER THE COUNTER    Patient/Guardian understands the instructions and was given the opportunity to ask questions and have them answered.    Opioid Risk Assessment Tool (Female)       Family history of substance abuse            Alcohol (1)  0            Illegal Drugs (2)  0            Prescription drugs (4)  0       Personal history of substance abuse            Alcohol (3)  0            Illegal Drugs (4)  0            Prescription drugs (5)  0       Age between 16-45 (1)  0  al abuse (3)  0       Psychological disease (ADD, ADHD, OCD, Bipolar Disorder, Schizophrenia, Depression) (2)  0    Scoring Totals: 0   Low Risk (0-3)  Moderate Risk (4-7)  High Risk (>/=8)    pt denies any suicidal ideation or thoughts, pt states not a threat to self or others

## 2025-06-04 NOTE — H&P PST ADULT - REASON FOR ADMISSION
Case Type: OP Block TimeSuite: OR SoteroProceduralist: Get Robertson  Confirmed Surgery DateTime: 06- - 0:00PAST DateTime: 06- - 7:30Procedure: left total knee replacement  ERP?: NoLaterality: LeftLength of Procedure: 120 Minutes  Anesthesia Type: Regional

## 2025-06-05 ENCOUNTER — NON-APPOINTMENT (OUTPATIENT)
Age: 55
End: 2025-06-05

## 2025-06-05 DIAGNOSIS — Z01.818 ENCOUNTER FOR OTHER PREPROCEDURAL EXAMINATION: ICD-10-CM

## 2025-06-05 DIAGNOSIS — M17.12 UNILATERAL PRIMARY OSTEOARTHRITIS, LEFT KNEE: ICD-10-CM

## 2025-06-05 LAB
BASOPHILS # BLD AUTO: 0.05 K/UL — SIGNIFICANT CHANGE UP (ref 0–0.2)
BASOPHILS NFR BLD AUTO: 1 % — SIGNIFICANT CHANGE UP (ref 0–2)
EOSINOPHIL # BLD AUTO: 0.2 K/UL — SIGNIFICANT CHANGE UP (ref 0–0.5)
EOSINOPHIL NFR BLD AUTO: 3.9 % — SIGNIFICANT CHANGE UP (ref 0–6)
HCT VFR BLD CALC: 40.8 % — SIGNIFICANT CHANGE UP (ref 34.5–45)
HGB BLD-MCNC: 13.5 G/DL — SIGNIFICANT CHANGE UP (ref 11.5–15.5)
IMM GRANULOCYTES NFR BLD AUTO: 0 % — SIGNIFICANT CHANGE UP (ref 0–0.9)
LYMPHOCYTES # BLD AUTO: 1.91 K/UL — SIGNIFICANT CHANGE UP (ref 1–3.3)
LYMPHOCYTES # BLD AUTO: 37.3 % — SIGNIFICANT CHANGE UP (ref 13–44)
MCHC RBC-ENTMCNC: 29.6 PG — SIGNIFICANT CHANGE UP (ref 27–34)
MCHC RBC-ENTMCNC: 33.1 G/DL — SIGNIFICANT CHANGE UP (ref 32–36)
MCV RBC AUTO: 89.5 FL — SIGNIFICANT CHANGE UP (ref 80–100)
MONOCYTES # BLD AUTO: 0.4 K/UL — SIGNIFICANT CHANGE UP (ref 0–0.9)
MONOCYTES NFR BLD AUTO: 7.8 % — SIGNIFICANT CHANGE UP (ref 2–14)
NEUTROPHILS # BLD AUTO: 2.56 K/UL — SIGNIFICANT CHANGE UP (ref 1.8–7.4)
NEUTROPHILS NFR BLD AUTO: 50 % — SIGNIFICANT CHANGE UP (ref 43–77)
PLATELET # BLD AUTO: 211 K/UL — SIGNIFICANT CHANGE UP (ref 150–400)
RBC # BLD: 4.56 M/UL — SIGNIFICANT CHANGE UP (ref 3.8–5.2)
RBC # FLD: 12.9 % — SIGNIFICANT CHANGE UP (ref 10.3–14.5)
WBC # BLD: 5.12 K/UL — SIGNIFICANT CHANGE UP (ref 3.8–10.5)
WBC # FLD AUTO: 5.12 K/UL — SIGNIFICANT CHANGE UP (ref 3.8–10.5)

## 2025-06-19 ENCOUNTER — RESULT REVIEW (OUTPATIENT)
Age: 55
End: 2025-06-19

## 2025-06-19 ENCOUNTER — TRANSCRIPTION ENCOUNTER (OUTPATIENT)
Age: 55
End: 2025-06-19

## 2025-06-19 ENCOUNTER — APPOINTMENT (OUTPATIENT)
Dept: ORTHOPEDIC SURGERY | Facility: HOSPITAL | Age: 55
End: 2025-06-19

## 2025-06-19 ENCOUNTER — INPATIENT (INPATIENT)
Facility: HOSPITAL | Age: 55
LOS: 2 days | Discharge: HOME HEALTH | DRG: 470 | End: 2025-06-22
Attending: ORTHOPAEDIC SURGERY | Admitting: ORTHOPAEDIC SURGERY
Payer: COMMERCIAL

## 2025-06-19 VITALS
HEART RATE: 73 BPM | DIASTOLIC BLOOD PRESSURE: 98 MMHG | SYSTOLIC BLOOD PRESSURE: 163 MMHG | WEIGHT: 214.95 LBS | HEIGHT: 68 IN | RESPIRATION RATE: 17 BRPM | TEMPERATURE: 99 F | OXYGEN SATURATION: 95 %

## 2025-06-19 DIAGNOSIS — M17.12 UNILATERAL PRIMARY OSTEOARTHRITIS, LEFT KNEE: ICD-10-CM

## 2025-06-19 DIAGNOSIS — M17.11 UNILATERAL PRIMARY OSTEOARTHRITIS, RIGHT KNEE: ICD-10-CM

## 2025-06-19 DIAGNOSIS — Z98.890 OTHER SPECIFIED POSTPROCEDURAL STATES: Chronic | ICD-10-CM

## 2025-06-19 LAB
ANION GAP SERPL CALC-SCNC: 16 MMOL/L — HIGH (ref 7–14)
BLD GP AB SCN SERPL QL: SIGNIFICANT CHANGE UP
BUN SERPL-MCNC: 12 MG/DL — SIGNIFICANT CHANGE UP (ref 10–20)
CALCIUM SERPL-MCNC: 9.2 MG/DL — SIGNIFICANT CHANGE UP (ref 8.4–10.5)
CHLORIDE SERPL-SCNC: 103 MMOL/L — SIGNIFICANT CHANGE UP (ref 98–110)
CO2 SERPL-SCNC: 19 MMOL/L — SIGNIFICANT CHANGE UP (ref 17–32)
CREAT SERPL-MCNC: 0.9 MG/DL — SIGNIFICANT CHANGE UP (ref 0.7–1.5)
EGFR: 76 ML/MIN/1.73M2 — SIGNIFICANT CHANGE UP
EGFR: 76 ML/MIN/1.73M2 — SIGNIFICANT CHANGE UP
GLUCOSE BLDC GLUCOMTR-MCNC: 120 MG/DL — HIGH (ref 70–99)
GLUCOSE BLDC GLUCOMTR-MCNC: 201 MG/DL — HIGH (ref 70–99)
GLUCOSE SERPL-MCNC: 191 MG/DL — HIGH (ref 70–99)
HCT VFR BLD CALC: 35.8 % — SIGNIFICANT CHANGE UP (ref 34.5–45)
HGB BLD-MCNC: 12.2 G/DL — SIGNIFICANT CHANGE UP (ref 11.5–15.5)
MCHC RBC-ENTMCNC: 29.9 PG — SIGNIFICANT CHANGE UP (ref 27–34)
MCHC RBC-ENTMCNC: 34.1 G/DL — SIGNIFICANT CHANGE UP (ref 32–36)
MCV RBC AUTO: 87.7 FL — SIGNIFICANT CHANGE UP (ref 80–100)
NRBC # BLD AUTO: 0 K/UL — SIGNIFICANT CHANGE UP (ref 0–0)
NRBC # FLD: 0 K/UL — SIGNIFICANT CHANGE UP (ref 0–0)
NRBC BLD AUTO-RTO: 0 /100 WBCS — SIGNIFICANT CHANGE UP (ref 0–0)
PLATELET # BLD AUTO: 192 K/UL — SIGNIFICANT CHANGE UP (ref 150–400)
PMV BLD: 10.4 FL — SIGNIFICANT CHANGE UP (ref 7–13)
POTASSIUM SERPL-MCNC: 3.9 MMOL/L — SIGNIFICANT CHANGE UP (ref 3.5–5)
POTASSIUM SERPL-SCNC: 3.9 MMOL/L — SIGNIFICANT CHANGE UP (ref 3.5–5)
RBC # BLD: 4.08 M/UL — SIGNIFICANT CHANGE UP (ref 3.8–5.2)
RBC # FLD: 12.2 % — SIGNIFICANT CHANGE UP (ref 10.3–14.5)
SODIUM SERPL-SCNC: 138 MMOL/L — SIGNIFICANT CHANGE UP (ref 135–146)
WBC # BLD: 15.61 K/UL — HIGH (ref 3.8–10.5)
WBC # FLD AUTO: 15.61 K/UL — HIGH (ref 3.8–10.5)

## 2025-06-19 PROCEDURE — 97535 SELF CARE MNGMENT TRAINING: CPT | Mod: GO

## 2025-06-19 PROCEDURE — 86850 RBC ANTIBODY SCREEN: CPT

## 2025-06-19 PROCEDURE — 97110 THERAPEUTIC EXERCISES: CPT | Mod: GP

## 2025-06-19 PROCEDURE — 85027 COMPLETE CBC AUTOMATED: CPT

## 2025-06-19 PROCEDURE — 94010 BREATHING CAPACITY TEST: CPT

## 2025-06-19 PROCEDURE — 36415 COLL VENOUS BLD VENIPUNCTURE: CPT

## 2025-06-19 PROCEDURE — 93005 ELECTROCARDIOGRAM TRACING: CPT

## 2025-06-19 PROCEDURE — 82962 GLUCOSE BLOOD TEST: CPT

## 2025-06-19 PROCEDURE — 86900 BLOOD TYPING SEROLOGIC ABO: CPT

## 2025-06-19 PROCEDURE — 83036 HEMOGLOBIN GLYCOSYLATED A1C: CPT

## 2025-06-19 PROCEDURE — 93010 ELECTROCARDIOGRAM REPORT: CPT

## 2025-06-19 PROCEDURE — 97116 GAIT TRAINING THERAPY: CPT | Mod: GP

## 2025-06-19 PROCEDURE — 86901 BLOOD TYPING SEROLOGIC RH(D): CPT

## 2025-06-19 PROCEDURE — 99221 1ST HOSP IP/OBS SF/LOW 40: CPT

## 2025-06-19 PROCEDURE — 73560 X-RAY EXAM OF KNEE 1 OR 2: CPT | Mod: 26,LT

## 2025-06-19 PROCEDURE — 80048 BASIC METABOLIC PNL TOTAL CA: CPT

## 2025-06-19 PROCEDURE — 88305 TISSUE EXAM BY PATHOLOGIST: CPT | Mod: 26

## 2025-06-19 PROCEDURE — 88311 DECALCIFY TISSUE: CPT | Mod: 26

## 2025-06-19 PROCEDURE — 84484 ASSAY OF TROPONIN QUANT: CPT

## 2025-06-19 PROCEDURE — 27447 TOTAL KNEE ARTHROPLASTY: CPT | Mod: LT

## 2025-06-19 PROCEDURE — 97162 PT EVAL MOD COMPLEX 30 MIN: CPT | Mod: GP

## 2025-06-19 PROCEDURE — 97165 OT EVAL LOW COMPLEX 30 MIN: CPT | Mod: GO

## 2025-06-19 RX ORDER — DEXAMETHASONE 0.5 MG/1
2 TABLET ORAL ONCE
Refills: 0 | Status: COMPLETED | OUTPATIENT
Start: 2025-06-19 | End: 2025-06-20

## 2025-06-19 RX ORDER — HYDROMORPHONE/SOD CHLOR,ISO/PF 2 MG/10 ML
0.5 SYRINGE (ML) INJECTION
Refills: 0 | Status: DISCONTINUED | OUTPATIENT
Start: 2025-06-19 | End: 2025-06-19

## 2025-06-19 RX ORDER — ONDANSETRON HCL/PF 4 MG/2 ML
4 VIAL (ML) INJECTION EVERY 6 HOURS
Refills: 0 | Status: DISCONTINUED | OUTPATIENT
Start: 2025-06-19 | End: 2025-06-22

## 2025-06-19 RX ORDER — SODIUM CHLORIDE 9 G/1000ML
1000 INJECTION, SOLUTION INTRAVENOUS
Refills: 0 | Status: DISCONTINUED | OUTPATIENT
Start: 2025-06-19 | End: 2025-06-19

## 2025-06-19 RX ORDER — GABAPENTIN 400 MG/1
100 CAPSULE ORAL THREE TIMES A DAY
Refills: 0 | Status: DISCONTINUED | OUTPATIENT
Start: 2025-06-19 | End: 2025-06-22

## 2025-06-19 RX ORDER — SENNA 187 MG
2 TABLET ORAL AT BEDTIME
Refills: 0 | Status: DISCONTINUED | OUTPATIENT
Start: 2025-06-19 | End: 2025-06-22

## 2025-06-19 RX ORDER — OXYCODONE HYDROCHLORIDE 30 MG/1
10 TABLET ORAL EVERY 4 HOURS
Refills: 0 | Status: DISCONTINUED | OUTPATIENT
Start: 2025-06-19 | End: 2025-06-22

## 2025-06-19 RX ORDER — OXYCODONE HYDROCHLORIDE 30 MG/1
5 TABLET ORAL ONCE
Refills: 0 | Status: DISCONTINUED | OUTPATIENT
Start: 2025-06-19 | End: 2025-06-19

## 2025-06-19 RX ORDER — OXYCODONE HYDROCHLORIDE 30 MG/1
5 TABLET ORAL EVERY 6 HOURS
Refills: 0 | Status: DISCONTINUED | OUTPATIENT
Start: 2025-06-19 | End: 2025-06-19

## 2025-06-19 RX ORDER — METHOCARBAMOL 500 MG/1
500 TABLET, FILM COATED ORAL EVERY 8 HOURS
Refills: 0 | Status: DISCONTINUED | OUTPATIENT
Start: 2025-06-19 | End: 2025-06-22

## 2025-06-19 RX ORDER — LISINOPRIL 5 MG/1
1 TABLET ORAL
Refills: 0 | DISCHARGE

## 2025-06-19 RX ORDER — NALOXONE HYDROCHLORIDE 0.4 MG/ML
4 INJECTION, SOLUTION INTRAMUSCULAR; INTRAVENOUS; SUBCUTANEOUS ONCE
Refills: 0 | Status: DISCONTINUED | OUTPATIENT
Start: 2025-06-19 | End: 2025-06-22

## 2025-06-19 RX ORDER — NALOXONE HYDROCHLORIDE 0.4 MG/ML
1 INJECTION, SOLUTION INTRAMUSCULAR; INTRAVENOUS; SUBCUTANEOUS ONCE
Refills: 0 | Status: DISCONTINUED | OUTPATIENT
Start: 2025-06-19 | End: 2025-06-19

## 2025-06-19 RX ORDER — MAGNESIUM HYDROXIDE 400 MG/5ML
30 SUSPENSION ORAL DAILY
Refills: 0 | Status: DISCONTINUED | OUTPATIENT
Start: 2025-06-19 | End: 2025-06-22

## 2025-06-19 RX ORDER — LISINOPRIL 5 MG/1
10 TABLET ORAL DAILY
Refills: 0 | Status: DISCONTINUED | OUTPATIENT
Start: 2025-06-19 | End: 2025-06-22

## 2025-06-19 RX ORDER — KETOROLAC TROMETHAMINE 30 MG/ML
15 INJECTION, SOLUTION INTRAMUSCULAR; INTRAVENOUS EVERY 6 HOURS
Refills: 0 | Status: DISCONTINUED | OUTPATIENT
Start: 2025-06-19 | End: 2025-06-19

## 2025-06-19 RX ORDER — CEFAZOLIN SODIUM IN 0.9 % NACL 3 G/100 ML
2000 INTRAVENOUS SOLUTION, PIGGYBACK (ML) INTRAVENOUS EVERY 8 HOURS
Refills: 0 | Status: COMPLETED | OUTPATIENT
Start: 2025-06-19 | End: 2025-06-20

## 2025-06-19 RX ORDER — ASPIRIN 325 MG
81 TABLET ORAL EVERY 12 HOURS
Refills: 0 | Status: DISCONTINUED | OUTPATIENT
Start: 2025-06-19 | End: 2025-06-22

## 2025-06-19 RX ORDER — OXYCODONE HYDROCHLORIDE 30 MG/1
5 TABLET ORAL EVERY 4 HOURS
Refills: 0 | Status: DISCONTINUED | OUTPATIENT
Start: 2025-06-19 | End: 2025-06-19

## 2025-06-19 RX ORDER — ACETAMINOPHEN 500 MG/5ML
650 LIQUID (ML) ORAL EVERY 6 HOURS
Refills: 0 | Status: COMPLETED | OUTPATIENT
Start: 2025-06-19 | End: 2025-06-22

## 2025-06-19 RX ORDER — OXYCODONE HYDROCHLORIDE 30 MG/1
5 TABLET ORAL EVERY 4 HOURS
Refills: 0 | Status: DISCONTINUED | OUTPATIENT
Start: 2025-06-19 | End: 2025-06-22

## 2025-06-19 RX ORDER — POLYETHYLENE GLYCOL 3350 17 G/17G
17 POWDER, FOR SOLUTION ORAL AT BEDTIME
Refills: 0 | Status: DISCONTINUED | OUTPATIENT
Start: 2025-06-19 | End: 2025-06-22

## 2025-06-19 RX ORDER — TRAMADOL HYDROCHLORIDE 50 MG/1
50 TABLET, FILM COATED ORAL EVERY 4 HOURS
Refills: 0 | Status: DISCONTINUED | OUTPATIENT
Start: 2025-06-19 | End: 2025-06-22

## 2025-06-19 RX ADMIN — TRAMADOL HYDROCHLORIDE 50 MILLIGRAM(S): 50 TABLET, FILM COATED ORAL at 23:44

## 2025-06-19 RX ADMIN — Medication 100 MILLIGRAM(S): at 21:14

## 2025-06-19 RX ADMIN — Medication 650 MILLIGRAM(S): at 23:44

## 2025-06-19 RX ADMIN — POLYETHYLENE GLYCOL 3350 17 GRAM(S): 17 POWDER, FOR SOLUTION ORAL at 21:14

## 2025-06-19 RX ADMIN — Medication 1000 MILLIGRAM(S): at 09:08

## 2025-06-19 RX ADMIN — OXYCODONE HYDROCHLORIDE 5 MILLIGRAM(S): 30 TABLET ORAL at 19:28

## 2025-06-19 RX ADMIN — Medication 2 TABLET(S): at 21:14

## 2025-06-19 RX ADMIN — Medication 650 MILLIGRAM(S): at 18:32

## 2025-06-19 RX ADMIN — OXYCODONE HYDROCHLORIDE 5 MILLIGRAM(S): 30 TABLET ORAL at 17:39

## 2025-06-19 RX ADMIN — CELECOXIB 400 MILLIGRAM(S): 50 CAPSULE ORAL at 09:08

## 2025-06-19 RX ADMIN — Medication 650 MILLIGRAM(S): at 17:02

## 2025-06-19 RX ADMIN — Medication 81 MILLIGRAM(S): at 18:34

## 2025-06-19 RX ADMIN — CELECOXIB 400 MILLIGRAM(S): 50 CAPSULE ORAL at 09:33

## 2025-06-19 RX ADMIN — GABAPENTIN 100 MILLIGRAM(S): 400 CAPSULE ORAL at 18:58

## 2025-06-19 RX ADMIN — Medication 1000 MILLIGRAM(S): at 09:33

## 2025-06-19 RX ADMIN — OXYCODONE HYDROCHLORIDE 5 MILLIGRAM(S): 30 TABLET ORAL at 18:58

## 2025-06-19 NOTE — DISCHARGE NOTE PROVIDER - HOSPITAL COURSE
On  6/19/2025 patient underwent a left knee replacement with Dr. charles.   Pt tolerated the procedure well with no complications.    They were  transferred to recovery unit in stable condition then to floor.   Post op received antibiotics for 24 hours and was started on deep vein thrombosis prevention. Patient participated with physical therapy and did well and was cleared for discharge home with services weight bearing as tolerated with a walker.   Pt to follow up with dr charles as a outpatient.   DC meds as per dc med rec. On  6/19/2025 patient underwent a left knee replacement with Dr. Robertson.   Pt tolerated the procedure well with no complications.    They were  transferred to recovery unit in stable condition then to floor.   Post op received antibiotics for 24 hours and was started on deep vein thrombosis prevention. Patient participated with physical therapy and did well and was cleared for discharge home with services weight bearing as tolerated with a walker.   Pt to follow up with dr Robertson as a outpatient.   DC meds as per dc med rec.

## 2025-06-19 NOTE — BRIEF OPERATIVE NOTE - NSICDXBRIEFPREOP_GEN_ALL_CORE_FT
PRE-OP DIAGNOSIS:  Osteoarthritis of left knee 19-Jun-2025 14:41:32  Haroldo Hodge  Localized osteoarthritis of left knee 19-Jun-2025 14:41:37  Haroldo Hodge

## 2025-06-19 NOTE — DISCHARGE NOTE PROVIDER - NSDCCPCAREPLAN_GEN_ALL_CORE_FT
PRINCIPAL DISCHARGE DIAGNOSIS  Diagnosis: S/P total knee replacement, left  Assessment and Plan of Treatment: REMOVE PREVENA DRESSING 1 WEEK AFTER SURGERY.  OK TO SHOWER, DO NOT SATURATE, PAT DRY.  ONCE REMOVED DO NOT.   CALL DR ABDUL TO REMOVE IN OFFICE.  APPLY ANY LOTIONS OR CREAMS TO INCISION.  iF INCREASED PAIN FEVER SWELLING OR DISCHARGE/BLEEDING CALL MD OFFICE.  PLEASE TAKE ASPIRIN 81MG EVERY 12 HOURS FOR THE NEXT 35 DAYS FOR BLOOD CLOT PREVENTION.  ICE TO KNEE   PLACE OVER A TOWEL, 20 MINUTES ON 20 MINUTES OFF.  PAIN MEDS AS PRESCRIBED.   PROTONIX FOR HEARTBURN PREVENTION.  SENNA STOOL SOFTENER AND NEEDED.  WEIGHT BEARING AS TOLERATED WITH A WALKER.  CALL OFFICE FOR APPOINTMENT

## 2025-06-19 NOTE — RAPID RESPONSE TEAM SUMMARY - NSADDTLFINDINGSRRT_GEN_ALL_CORE
Pt AAOx3 no AMS/confusion/post ictal  Pt moved to bed via wheelchair, repeat /60  pt received NS bolus, Obtained stat labs and EKG  EKG notes sinus bradycardia, no ischemia  Pt AAOx3 no AMS/confusion/post ictal  Pt moved to bed via wheelchair, repeat /60     pt received NS bolus, Obtained stat labs and EKG  EKG notes sinus bradycardia, no ischemia

## 2025-06-19 NOTE — DISCHARGE NOTE PROVIDER - NSDCMRMEDTOKEN_GEN_ALL_CORE_FT
lisinopril 10 mg oral tablet: 1 tab(s) orally once a day   acetaminophen 325 mg oral tablet: 2 tab(s) orally every 6 hours MDD: 8  aspirin 81 mg oral delayed release tablet: 1 tab(s) orally every 12 hours MDD: 2  lisinopril 10 mg oral tablet: 1 tab(s) orally once a day  methocarbamol 500 mg oral tablet: 1 tab(s) orally every 8 hours as needed for Muscle Spasm MDD: 3  naloxone 4 mg/0.1 mL nasal spray: 1 spray(s) intranasally once a day as needed for prn opioid overdose MDD: 1  oxyCODONE 5 mg oral tablet: 1 tab(s) orally every 8 hours as needed for breakthrough pain MDD: 3  pantoprazole 40 mg oral delayed release tablet: 1 tab(s) orally once a day (before a meal) MDD: 1  senna leaf extract oral tablet: 2 tab(s) orally once a day (at bedtime) as needed for  constipation MDD: 2  traMADol 50 mg oral tablet: 1 tab(s) orally every 6 hours as needed for  severe pain MDD: 4

## 2025-06-19 NOTE — DISCHARGE NOTE PROVIDER - CARE PROVIDER_API CALL
Get Robertson  Orthopaedic Surgery  [address], [address]  [place] [address]  Phone: [phone]  Fax: [phone]  Follow Up Time: 1 week

## 2025-06-19 NOTE — PATIENT PROFILE ADULT - FALL HARM RISK - HARM RISK INTERVENTIONS
Assistance with ambulation/Assistance OOB with selected safe patient handling equipment/Communicate Risk of Fall with Harm to all staff/Discuss with provider need for PT consult/Monitor gait and stability/Provide patient with walking aids - walker, cane, crutches/Reinforce activity limits and safety measures with patient and family/Sit up slowly, dangle for a short time, stand at bedside before walking/Tailored Fall Risk Interventions/Use of alarms - bed, chair and/or voice tab/Visual Cue: Yellow wristband and red socks/Bed in lowest position, wheels locked, appropriate side rails in place/Call bell, personal items and telephone in reach/Instruct patient to call for assistance before getting out of bed or chair/Non-slip footwear when patient is out of bed/[place] to call system/Physically safe environment - no spills, clutter or unnecessary equipment/Purposeful Proactive Rounding/Room/bathroom lighting operational, light cord in reach

## 2025-06-19 NOTE — RAPID RESPONSE TEAM SUMMARY - NSSITUATIONBACKGROUNDRRT_GEN_ALL_CORE
Called to rapid response due to  Called to rapid response due to brief v near syncope     pt POD#0 OOB with assistance when she had episode of syncope. Rn reports pt becoming diaphoretic and weak, they sat her down in the bathroom on the toilet. BP 75/50s Rapid response called.

## 2025-06-19 NOTE — DISCHARGE NOTE PROVIDER - NSDCCPGOAL_GEN_ALL_CORE_FT
To get better and follow your care plan as instructed. To get better and follow your care plan as instructed.  PT, wbat with walker  keep postop dressing 1 week  take meds as prescribed  take aspirin 81 mg twice a day x 30 days for blood clots prevention  take protonix x 30 days for stomach protection  f/u as OP with dr Robertson in 1 week

## 2025-06-19 NOTE — CHART NOTE - NSCHARTNOTEFT_GEN_A_CORE
Lab results from labs drawn during rapid response reviewed. Generally unremarkable except for leukocytosis (WBC of 15.61), slight increase in anion gap and mildly elevated glucose/FSBS readings. All can be explained by stress related to near syncope event. However will repeat BMP and CBC in am along with a HGB A1C to see if further intervention needed (patient did get a NS bolus during rapid response) Lab results from labs drawn during rapid response reviewed. Generally unremarkable except for leukocytosis (WBC of 15.61), slight increase in anion gap and mildly elevated glucose/FSBS readings. All can be explained by stress related to near syncope event. However will repeat BMP and CBC in am along with a HGB A1C to see if further intervention needed (patient did get a NS bolus during rapid response). Would characterize event as a vasovagal attack post op knee surgery

## 2025-06-19 NOTE — PROGRESS NOTE ADULT - ASSESSMENT
post op note     s/p left tkr pod 0   pt seen at bedside, doing well pain controlled, mild discomfort proximal incision   used [place] translate  via phone     Vital Signs Last 24 Hrs  T(C): 36.5 (19 Jun 2025 15:45), Max: 37.1 (19 Jun 2025 09:07)  T(F): 97.7 (19 Jun 2025 15:45), Max: 98.8 (19 Jun 2025 09:07)  HR: 67 (19 Jun 2025 16:30) (51 - 76)  BP: 167/78 (19 Jun 2025 16:30) (118/68 - 167/78)  BP(mean): --  RR: 18 (19 Jun 2025 16:30) (14 - 19)  SpO2: 98% (19 Jun 2025 16:30) (94% - 99%)    left knee:    dressing in place, prevana is on and working suction intact   nvid df/pf intact  samina and ipc in place b/l       s/p left knee replacement pod 0     pain control   dvt proph   am labs   pt ot   med cs   abx 24 hours   dispo planning

## 2025-06-20 LAB
A1C WITH ESTIMATED AVERAGE GLUCOSE RESULT: 6.2 % — HIGH (ref 4–5.6)
ANION GAP SERPL CALC-SCNC: 10 MMOL/L — SIGNIFICANT CHANGE UP (ref 7–14)
BUN SERPL-MCNC: 15 MG/DL — SIGNIFICANT CHANGE UP (ref 10–20)
CALCIUM SERPL-MCNC: 9 MG/DL — SIGNIFICANT CHANGE UP (ref 8.4–10.5)
CHLORIDE SERPL-SCNC: 104 MMOL/L — SIGNIFICANT CHANGE UP (ref 98–110)
CO2 SERPL-SCNC: 25 MMOL/L — SIGNIFICANT CHANGE UP (ref 17–32)
CREAT SERPL-MCNC: 0.9 MG/DL — SIGNIFICANT CHANGE UP (ref 0.7–1.5)
EGFR: 76 ML/MIN/1.73M2 — SIGNIFICANT CHANGE UP
EGFR: 76 ML/MIN/1.73M2 — SIGNIFICANT CHANGE UP
ESTIMATED AVERAGE GLUCOSE: 131 MG/DL — HIGH (ref 68–114)
GLUCOSE BLDC GLUCOMTR-MCNC: 122 MG/DL — HIGH (ref 70–99)
GLUCOSE SERPL-MCNC: 131 MG/DL — HIGH (ref 70–99)
HCT VFR BLD CALC: 32.2 % — LOW (ref 34.5–45)
HGB BLD-MCNC: 10.7 G/DL — LOW (ref 11.5–15.5)
MCHC RBC-ENTMCNC: 29.6 PG — SIGNIFICANT CHANGE UP (ref 27–34)
MCHC RBC-ENTMCNC: 33.2 G/DL — SIGNIFICANT CHANGE UP (ref 32–36)
MCV RBC AUTO: 89.2 FL — SIGNIFICANT CHANGE UP (ref 80–100)
NRBC # BLD AUTO: 0 K/UL — SIGNIFICANT CHANGE UP (ref 0–0)
NRBC # FLD: 0 K/UL — SIGNIFICANT CHANGE UP (ref 0–0)
NRBC BLD AUTO-RTO: 0 /100 WBCS — SIGNIFICANT CHANGE UP (ref 0–0)
PLATELET # BLD AUTO: 162 K/UL — SIGNIFICANT CHANGE UP (ref 150–400)
PMV BLD: 10.5 FL — SIGNIFICANT CHANGE UP (ref 7–13)
POTASSIUM SERPL-MCNC: 4.2 MMOL/L — SIGNIFICANT CHANGE UP (ref 3.5–5)
POTASSIUM SERPL-SCNC: 4.2 MMOL/L — SIGNIFICANT CHANGE UP (ref 3.5–5)
RBC # BLD: 3.61 M/UL — LOW (ref 3.8–5.2)
RBC # FLD: 12.3 % — SIGNIFICANT CHANGE UP (ref 10.3–14.5)
SODIUM SERPL-SCNC: 139 MMOL/L — SIGNIFICANT CHANGE UP (ref 135–146)
TROPONIN T, HIGH SENSITIVITY RESULT: 9 NG/L — SIGNIFICANT CHANGE UP (ref 6–13)
WBC # BLD: 9.04 K/UL — SIGNIFICANT CHANGE UP (ref 3.8–10.5)
WBC # FLD AUTO: 9.04 K/UL — SIGNIFICANT CHANGE UP (ref 3.8–10.5)

## 2025-06-20 PROCEDURE — 99232 SBSQ HOSP IP/OBS MODERATE 35: CPT

## 2025-06-20 RX ORDER — ASPIRIN 325 MG
1 TABLET ORAL
Qty: 60 | Refills: 0
Start: 2025-06-20 | End: 2025-07-19

## 2025-06-20 RX ORDER — OXYCODONE HYDROCHLORIDE 30 MG/1
1 TABLET ORAL
Qty: 12 | Refills: 0
Start: 2025-06-20 | End: 2025-06-23

## 2025-06-20 RX ORDER — ACETAMINOPHEN 500 MG/5ML
2 LIQUID (ML) ORAL
Qty: 96 | Refills: 0
Start: 2025-06-20 | End: 2025-07-01

## 2025-06-20 RX ORDER — SENNA 187 MG
2 TABLET ORAL
Qty: 30 | Refills: 0
Start: 2025-06-20 | End: 2025-07-04

## 2025-06-20 RX ORDER — TRAMADOL HYDROCHLORIDE 50 MG/1
1 TABLET, FILM COATED ORAL
Qty: 24 | Refills: 0
Start: 2025-06-20 | End: 2025-06-25

## 2025-06-20 RX ORDER — NALOXONE HYDROCHLORIDE 0.4 MG/ML
1 INJECTION, SOLUTION INTRAMUSCULAR; INTRAVENOUS; SUBCUTANEOUS
Qty: 2 | Refills: 0
Start: 2025-06-20 | End: 2025-06-23

## 2025-06-20 RX ORDER — METHOCARBAMOL 500 MG/1
1 TABLET, FILM COATED ORAL
Qty: 30 | Refills: 0
Start: 2025-06-20 | End: 2025-06-29

## 2025-06-20 RX ADMIN — TRAMADOL HYDROCHLORIDE 50 MILLIGRAM(S): 50 TABLET, FILM COATED ORAL at 00:21

## 2025-06-20 RX ADMIN — OXYCODONE HYDROCHLORIDE 5 MILLIGRAM(S): 30 TABLET ORAL at 09:50

## 2025-06-20 RX ADMIN — Medication 40 MILLIGRAM(S): at 05:25

## 2025-06-20 RX ADMIN — TRAMADOL HYDROCHLORIDE 50 MILLIGRAM(S): 50 TABLET, FILM COATED ORAL at 18:06

## 2025-06-20 RX ADMIN — Medication 650 MILLIGRAM(S): at 18:06

## 2025-06-20 RX ADMIN — Medication 100 MILLIGRAM(S): at 05:26

## 2025-06-20 RX ADMIN — Medication 650 MILLIGRAM(S): at 17:11

## 2025-06-20 RX ADMIN — POLYETHYLENE GLYCOL 3350 17 GRAM(S): 17 POWDER, FOR SOLUTION ORAL at 21:31

## 2025-06-20 RX ADMIN — OXYCODONE HYDROCHLORIDE 10 MILLIGRAM(S): 30 TABLET ORAL at 16:19

## 2025-06-20 RX ADMIN — Medication 650 MILLIGRAM(S): at 11:27

## 2025-06-20 RX ADMIN — Medication 650 MILLIGRAM(S): at 05:26

## 2025-06-20 RX ADMIN — OXYCODONE HYDROCHLORIDE 5 MILLIGRAM(S): 30 TABLET ORAL at 22:39

## 2025-06-20 RX ADMIN — METHOCARBAMOL 500 MILLIGRAM(S): 500 TABLET, FILM COATED ORAL at 15:58

## 2025-06-20 RX ADMIN — Medication 81 MILLIGRAM(S): at 05:25

## 2025-06-20 RX ADMIN — Medication 81 MILLIGRAM(S): at 17:11

## 2025-06-20 RX ADMIN — Medication 650 MILLIGRAM(S): at 12:15

## 2025-06-20 RX ADMIN — Medication 650 MILLIGRAM(S): at 06:22

## 2025-06-20 RX ADMIN — GABAPENTIN 100 MILLIGRAM(S): 400 CAPSULE ORAL at 21:31

## 2025-06-20 RX ADMIN — OXYCODONE HYDROCHLORIDE 5 MILLIGRAM(S): 30 TABLET ORAL at 21:31

## 2025-06-20 RX ADMIN — GABAPENTIN 100 MILLIGRAM(S): 400 CAPSULE ORAL at 14:13

## 2025-06-20 RX ADMIN — Medication 2 TABLET(S): at 21:30

## 2025-06-20 RX ADMIN — Medication 650 MILLIGRAM(S): at 23:07

## 2025-06-20 RX ADMIN — TRAMADOL HYDROCHLORIDE 50 MILLIGRAM(S): 50 TABLET, FILM COATED ORAL at 17:11

## 2025-06-20 RX ADMIN — OXYCODONE HYDROCHLORIDE 5 MILLIGRAM(S): 30 TABLET ORAL at 08:55

## 2025-06-20 RX ADMIN — GABAPENTIN 100 MILLIGRAM(S): 400 CAPSULE ORAL at 05:25

## 2025-06-20 RX ADMIN — Medication 650 MILLIGRAM(S): at 00:21

## 2025-06-20 RX ADMIN — OXYCODONE HYDROCHLORIDE 10 MILLIGRAM(S): 30 TABLET ORAL at 00:00

## 2025-06-20 RX ADMIN — LISINOPRIL 10 MILLIGRAM(S): 5 TABLET ORAL at 05:25

## 2025-06-20 RX ADMIN — Medication 1 APPLICATION(S): at 05:31

## 2025-06-20 RX ADMIN — DEXAMETHASONE 2 MILLIGRAM(S): 0.5 TABLET ORAL at 11:27

## 2025-06-20 NOTE — OCCUPATIONAL THERAPY INITIAL EVALUATION ADULT - LIVES WITH, PROFILE
15 year old daughter in a 2 story apartment building 3 steps to enter with wide B/L rails; (+) bathtub (+) standard toilet/children

## 2025-06-20 NOTE — OCCUPATIONAL THERAPY INITIAL EVALUATION ADULT - LEVEL OF INDEPENDENCE, OT EVAL
Pt with intermittent sweating for about a week. Pt states yesterday sweating was accompanied by blurry vision that lasted for several minutes. Pt VAN - with equal strength of bilateral upper and lower extremities.
contact guard

## 2025-06-20 NOTE — PHYSICAL THERAPY INITIAL EVALUATION ADULT - GENERAL OBSERVATIONS, REHAB EVAL
8;50-9;30 pt was seen for PT IE at bed side, pt is agreeable, chart thoroughly reviewed, RN Espinoza is aware. Pt was received semi jensen in bed, in no apparent distress, +IV, mepilex dressing is dry and intact L knee, +call bell within reach, bed side table at reach.

## 2025-06-20 NOTE — OCCUPATIONAL THERAPY INITIAL EVALUATION ADULT - STANDING BALANCE: DYNAMIC, REHAB EVAL
Please call patient regarding the following labs:    BMP:  Sodium (mmol/L)   Date Value   09/26/2018 141     Potassium (mmol/L)   Date Value   09/26/2018 3.6     Chloride (mmol/L)   Date Value   09/26/2018 106     Glucose (mg/dL)   Date Value   09/26/2018 72     CALCIUM (mg/dL)   Date Value   09/26/2018 9.5     Carbon Dioxide (mmol/L)   Date Value   09/26/2018 28     BUN (mg/dL)   Date Value   09/26/2018 34 (H)     Creatinine (mg/dL)   Date Value   09/26/2018 1.20 (H)       LFT:  AST/SGOT (Units/L)   Date Value   12/17/2018 20     ALT/SGPT (Units/L)   Date Value   12/17/2018 18     ALK PHOSPHATASE (Units/L)   Date Value   12/17/2018 92     TOTAL BILIRUBIN (mg/dL)   Date Value   12/17/2018 0.5       LIPID:  CHOLESTEROL (mg/dL)   Date Value   12/17/2018 172     HDL (mg/dL)   Date Value   12/17/2018 44 (L)     CHOL/HDL (no units)   Date Value   12/17/2018 3.9     TRIGLYCERIDE (mg/dL)   Date Value   12/17/2018 242 (H)     CALCULATED LDL (mg/dL)   Date Value   12/17/2018 80     TSH:  TSH (mcUnits/mL)   Date Value   11/10/2017 2.959       HgbA1c:  Hemoglobin A1C (%)   Date Value   09/26/2018 6.7 (H)       Assessment:   Patient was due for thyroid and diabetic lab testing end of 12/2019. Plan: Please call patient. - found out how her accuchecks have been running. Is she having any low readings?     - have her come in for labs. Do not have to be fasting.     - If her sugars have not been running below 100, ok to refill glimepiride. If sugars running below 100 let me know before refill sent in.     - schedule her for medicare wellness and follow up visit for next month. Please call patient. without support/fair balance

## 2025-06-20 NOTE — PHYSICAL THERAPY INITIAL EVALUATION ADULT - THERAPY FREQUENCY, PT EVAL
Counseling and Referral of Other Preventative  (Italic type indicates deductible and co-insurance are waived)    Patient Name: Shannon Franco  Today's Date: 8/17/2022    Health Maintenance       Date Due Completion Date    TETANUS VACCINE Never done ---    Shingles Vaccine (1 of 2) Never done ---    COVID-19 Vaccine (3 - Booster for Moderna series) 07/02/2021 2/2/2021    Lipid Panel 02/28/2023 2/28/2022    Colonoscopy 03/09/2027 3/9/2022        No orders of the defined types were placed in this encounter.    The following information is provided to all patients.  This information is to help you find resources for any of the problems found today that may be affecting your health:                Living healthy guide: www.[facility].[place].gov      Understanding Diabetes: www.diabetes.org      Eating healthy: www.cdc.gov/healthyweight      [facility] home safety checklist: www.cdc.gov/steadi/patient.html      Agency on Aging: www.goea.[place].gov      Alcoholics anonymous (AA): www.aa.org      Physical Activity: www.elvia.nih.gov/ru5nrjw      Tobacco use: www.quitwithusla.org      daily

## 2025-06-20 NOTE — PHYSICAL THERAPY INITIAL EVALUATION ADULT - RANGE OF MOTION EXAMINATION, REHAB EVAL
L hip and ankle _WFL, L knee is limited 2/2 sx and pain and dressing/Right LE ROM was WFL (within functional limits)

## 2025-06-20 NOTE — PHYSICAL THERAPY INITIAL EVALUATION ADULT - PHYSICAL ASSIST/NONPHYSICAL ASSIST: GAIT, REHAB EVAL
What Type Of Note Output Would You Prefer (Optional)?: Standard Output Hpi Title: Evaluation of Skin Lesions verbal cues/1 person assist How Severe Are Your Spot(S)?: mild Have Your Spot(S) Been Treated In The Past?: has not been treated

## 2025-06-20 NOTE — OCCUPATIONAL THERAPY INITIAL EVALUATION ADULT - GENERAL OBSERVATIONS, REHAB EVAL
11:32-12:20 Chart reviewed, ok to treat by Occupational Therapist as confirmed by RN Espinoza, Pt received seated in recliner chair (+) IV (disconnected by RN) (+) prevena Left knee in NAD. Pt in agreement with OT IE.

## 2025-06-20 NOTE — OCCUPATIONAL THERAPY INITIAL EVALUATION ADULT - LEVEL OF INDEPENDENCE: TUB, REHAB EVAL
Until cleared by MD Pt advised to sponge bathe at this time and to practice tub transfer with home care therapist and have family member present prior to attempting independently. Pt verbalized good understanding and agreement.

## 2025-06-20 NOTE — PHYSICAL THERAPY INITIAL EVALUATION ADULT - ADDITIONAL COMMENTS
pt lives with her 15 years old daughter in an apartment in a 2 story building with 3 steps to enter with B rails and one level inside.  Pt was independent community ambulator prior to admission without AD

## 2025-06-20 NOTE — CONSULT NOTE ADULT - SUBJECTIVE AND OBJECTIVE BOX
HOSPITALIST CONSULT for ortho  History and Physical     SARABJIT RAYMOND  54y, Female  Allergy: No Known Allergies      CHIEF COMPLAINT: left knee replacement  (19 Jun 2025 16:46)      HPI:  6/19/2025 patient underwent a left knee replacement with Dr. charles.   Pt tolerated the procedure well with no complications.    They were  transferred to recovery unit in stable condition then to floor.   Post op received antibiotics for 24 hours and was started on deep vein thrombosis prevention. Patient participated with physical therapy and did well and was cleared for discharge home with services weight bearing as tolerated with a walker.   Pt to follow up with dr charles as a outpatient.  Denies any complaints   HPI:    FAMILY HISTORY:    PAST MEDICAL & SURGICAL HISTORY:  Benign essential HTN      History of urologic surgery      S/P left knee arthroscopy          SOCIAL HISTORY  Social History:      Home Medications:  lisinopril 10 mg oral tablet: 1 tab(s) orally once a day (19 Jun 2025 09:20)      ROS  General: Denies fevers, chills, nightsweats, weight loss  HEENT: Denies headache, rhinorrhea, sore throat, eye pain  CV: Denies CP, palpitations  PULM: Denies SOB, cough  GI: Denies abdominal pain, diarrhea  : Denies dysuria, hematuria  MSK: Denies arthralgias  SKIN: Denies rash   NEURO: Denies paresthesias, weakness  PSYCH: Denies depression    VITALS:  T(F): 99.2, Max: 99.2 (06-20-25 @ 12:56)  HR: 76  BP: 108/69  RR: 16Vital Signs Last 24 Hrs  T(C): 37.3 (20 Jun 2025 12:56), Max: 37.3 (20 Jun 2025 12:56)  T(F): 99.2 (20 Jun 2025 12:56), Max: 99.2 (20 Jun 2025 12:56)  HR: 76 (20 Jun 2025 12:56) (49 - 76)  BP: 108/69 (20 Jun 2025 12:56) (75/43 - 167/78)  BP(mean): --  RR: 16 (20 Jun 2025 12:56) (14 - 19)  SpO2: 97% (20 Jun 2025 12:56) (94% - 99%)    Parameters below as of 19 Jun 2025 19:00  Patient On (Oxygen Delivery Method): room air        PHYSICAL EXAM:  Gen: NAD, resting in bed  HEENT: Normocephalic, atraumatic  Neck: supple, no lymphadenopathy  CV: Regular rate & regular rhythm  Lungs: CTABL no wheeze  Abdomen: Soft, NTND+ BS present  Ext: Warm, well perfused no CCE  Neuro: non focal, awake, CN II-XII intact   Skin: no rash, no erythema  Psych: no SI, HI, Hallucination     TESTS & MEASUREMENTS:                        10.7   9.04  )-----------( 162      ( 20 Jun 2025 07:59 )             32.2     06-20    139  |  104  |  15  ----------------------------<  131[H]  4.2   |  25  |  0.9    Ca    9.0      20 Jun 2025 07:59          Urinalysis Basic - ( 20 Jun 2025 07:59 )    Color: x / Appearance: x / SG: x / pH: x  Gluc: 131 mg/dL / Ketone: x  / Bili: x / Urobili: x   Blood: x / Protein: x / Nitrite: x   Leuk Esterase: x / RBC: x / WBC x   Sq Epi: x / Non Sq Epi: x / Bacteria: x            QRS axis to [] ° and NSR at a rate of [] BPM. There was no atrial enlargement. There was no ventricular hypertrophy. There were no ST-T changes and all intervals were normal.      INFECTIOUS DISEASES TESTING  MRSA PCR Result.: Negative (06-04-25 @ 10:01)      RADIOLOGY & ADDITIONAL TESTS:  I have personally reviewed the last Chest xray  CXR      CT      CARDIOLOGY TESTING  12 Lead ECG:   Ventricular Rate 55 BPM    Atrial Rate 55 BPM    P-R Interval 176 ms    QRS Duration 82 ms    Q-T Interval 454 ms    QTC Calculation(Bazett) 434 ms    P Axis 46 degrees    R Axis 3 degrees    T Axis 4 degrees    Diagnosis Line Sinus bradycardia with sinus arrhythmia  Otherwise normal ECG (06-19-25 @ 19:28)      MEDICATIONS  (STANDING):  acetaminophen     Tablet .. 650 milliGRAM(s) Oral every 6 hours  aspirin enteric coated 81 milliGRAM(s) Oral every 12 hours  chlorhexidine 2% Cloths 1 Application(s) Topical <User Schedule>  gabapentin 100 milliGRAM(s) Oral three times a day  lisinopril 10 milliGRAM(s) Oral daily  pantoprazole    Tablet 40 milliGRAM(s) Oral before breakfast  polyethylene glycol 3350 17 Gram(s) Oral at bedtime  senna 2 Tablet(s) Oral at bedtime  sodium chloride 0.9%. 1000 milliLiter(s) (75 mL/Hr) IV Continuous <Continuous>    MEDICATIONS  (PRN):  magnesium hydroxide Suspension 30 milliLiter(s) Oral daily PRN Constipation  methocarbamol 500 milliGRAM(s) Oral every 8 hours PRN Muscle Spasm  naloxone 1 mG/mL Injection for Intranasal Use 4 milliGRAM(s) IntraNasal once PRN for somnulence  ondansetron Injectable 4 milliGRAM(s) IV Push every 6 hours PRN Nausea and/or Vomiting  oxyCODONE    IR 5 milliGRAM(s) Oral every 4 hours PRN Moderate Pain (4 - 6)  oxyCODONE    IR 10 milliGRAM(s) Oral every 4 hours PRN Severe Pain (7 - 10)  traMADol 50 milliGRAM(s) Oral every 4 hours PRN Mild Pain (1 - 3)      ASSESSMENT  54y F admitted with Primary osteoarthritis of right knee SP left knee replacement     HTN well controlled on lisinopril   sinus bradycardia with no symptoms   Pain control   DVT prophylaxis  fu with PCP   Please call with questions

## 2025-06-21 LAB
HCT VFR BLD CALC: 31.1 % — LOW (ref 34.5–45)
HGB BLD-MCNC: 10.6 G/DL — LOW (ref 11.5–15.5)
MCHC RBC-ENTMCNC: 30.6 PG — SIGNIFICANT CHANGE UP (ref 27–34)
MCHC RBC-ENTMCNC: 34.1 G/DL — SIGNIFICANT CHANGE UP (ref 32–36)
MCV RBC AUTO: 89.9 FL — SIGNIFICANT CHANGE UP (ref 80–100)
NRBC # BLD AUTO: 0 K/UL — SIGNIFICANT CHANGE UP (ref 0–0)
NRBC # FLD: 0 K/UL — SIGNIFICANT CHANGE UP (ref 0–0)
NRBC BLD AUTO-RTO: 0 /100 WBCS — SIGNIFICANT CHANGE UP (ref 0–0)
PLATELET # BLD AUTO: 148 K/UL — LOW (ref 150–400)
PMV BLD: 11.1 FL — SIGNIFICANT CHANGE UP (ref 7–13)
RBC # BLD: 3.46 M/UL — LOW (ref 3.8–5.2)
RBC # FLD: 12.6 % — SIGNIFICANT CHANGE UP (ref 10.3–14.5)
WBC # BLD: 9.59 K/UL — SIGNIFICANT CHANGE UP (ref 3.8–10.5)
WBC # FLD AUTO: 9.59 K/UL — SIGNIFICANT CHANGE UP (ref 3.8–10.5)

## 2025-06-21 RX ORDER — IBUPROFEN 200 MG
800 TABLET ORAL EVERY 8 HOURS
Refills: 0 | Status: DISCONTINUED | OUTPATIENT
Start: 2025-06-21 | End: 2025-06-22

## 2025-06-21 RX ADMIN — TRAMADOL HYDROCHLORIDE 50 MILLIGRAM(S): 50 TABLET, FILM COATED ORAL at 08:57

## 2025-06-21 RX ADMIN — Medication 650 MILLIGRAM(S): at 12:15

## 2025-06-21 RX ADMIN — Medication 800 MILLIGRAM(S): at 21:35

## 2025-06-21 RX ADMIN — OXYCODONE HYDROCHLORIDE 5 MILLIGRAM(S): 30 TABLET ORAL at 10:30

## 2025-06-21 RX ADMIN — TRAMADOL HYDROCHLORIDE 50 MILLIGRAM(S): 50 TABLET, FILM COATED ORAL at 09:30

## 2025-06-21 RX ADMIN — Medication 650 MILLIGRAM(S): at 17:42

## 2025-06-21 RX ADMIN — Medication 40 MILLIGRAM(S): at 05:21

## 2025-06-21 RX ADMIN — GABAPENTIN 100 MILLIGRAM(S): 400 CAPSULE ORAL at 12:21

## 2025-06-21 RX ADMIN — Medication 650 MILLIGRAM(S): at 06:25

## 2025-06-21 RX ADMIN — Medication 650 MILLIGRAM(S): at 17:12

## 2025-06-21 RX ADMIN — Medication 81 MILLIGRAM(S): at 17:12

## 2025-06-21 RX ADMIN — GABAPENTIN 100 MILLIGRAM(S): 400 CAPSULE ORAL at 05:21

## 2025-06-21 RX ADMIN — OXYCODONE HYDROCHLORIDE 5 MILLIGRAM(S): 30 TABLET ORAL at 09:43

## 2025-06-21 RX ADMIN — Medication 650 MILLIGRAM(S): at 00:00

## 2025-06-21 RX ADMIN — Medication 650 MILLIGRAM(S): at 23:47

## 2025-06-21 RX ADMIN — Medication 650 MILLIGRAM(S): at 11:12

## 2025-06-21 RX ADMIN — Medication 800 MILLIGRAM(S): at 13:55

## 2025-06-21 RX ADMIN — Medication 81 MILLIGRAM(S): at 05:21

## 2025-06-21 RX ADMIN — Medication 800 MILLIGRAM(S): at 21:05

## 2025-06-21 RX ADMIN — LISINOPRIL 10 MILLIGRAM(S): 5 TABLET ORAL at 05:21

## 2025-06-21 RX ADMIN — GABAPENTIN 100 MILLIGRAM(S): 400 CAPSULE ORAL at 21:05

## 2025-06-21 RX ADMIN — Medication 650 MILLIGRAM(S): at 05:21

## 2025-06-21 RX ADMIN — Medication 800 MILLIGRAM(S): at 13:09

## 2025-06-21 NOTE — CHART NOTE - NSCHARTNOTEFT_GEN_A_CORE
Patient with persistent uncontrolled post operative pain. After discussion with Dr. Robertson and Dr. Reno, will give ibuprofen 800 mg TID x 3 days.

## 2025-06-22 VITALS
RESPIRATION RATE: 16 BRPM | OXYGEN SATURATION: 98 % | DIASTOLIC BLOOD PRESSURE: 88 MMHG | HEART RATE: 84 BPM | TEMPERATURE: 99 F | SYSTOLIC BLOOD PRESSURE: 137 MMHG

## 2025-06-22 RX ADMIN — GABAPENTIN 100 MILLIGRAM(S): 400 CAPSULE ORAL at 05:13

## 2025-06-22 RX ADMIN — Medication 40 MILLIGRAM(S): at 05:12

## 2025-06-22 RX ADMIN — Medication 800 MILLIGRAM(S): at 05:42

## 2025-06-22 RX ADMIN — Medication 650 MILLIGRAM(S): at 00:20

## 2025-06-22 RX ADMIN — Medication 650 MILLIGRAM(S): at 05:12

## 2025-06-22 RX ADMIN — Medication 81 MILLIGRAM(S): at 05:12

## 2025-06-22 RX ADMIN — LISINOPRIL 10 MILLIGRAM(S): 5 TABLET ORAL at 05:13

## 2025-06-22 RX ADMIN — Medication 650 MILLIGRAM(S): at 05:42

## 2025-06-22 RX ADMIN — Medication 800 MILLIGRAM(S): at 05:13

## 2025-06-22 RX ADMIN — Medication 1 APPLICATION(S): at 05:14

## 2025-06-22 NOTE — CHART NOTE - NSCHARTNOTEFT_GEN_A_CORE
Patient reports Mild loss of sensation in 4/5 th fingers left hand    PE  hand strenght equal bilaterally       both hands equally warm       good capillary refill       reports mild        mildly decreased sensation 4/5th  fingers: left hand    Maybe due to position of hand/arm while in OR for the sugery    I told patient to monitor at home and if this persists to contact the Orthopedic surgeon

## 2025-06-22 NOTE — DISCHARGE NOTE NURSING/CASE MANAGEMENT/SOCIAL WORK - PATIENT PORTAL LINK FT
You can access the FollowMyHealth Patient Portal offered by [facility] by registering at the following website: [email or web address]. By joining [facility]’s FollowMyHealth portal, you will also be able to view your health information using other applications (apps) compatible with our system.

## 2025-06-22 NOTE — CHART NOTE - NSCHARTNOTESELECT_GEN_ALL_CORE
Discharge Note/Event Note
Event Note
Mild loss of sensation in 4/5 th fingers left hand/Event Note
Event Note

## 2025-06-22 NOTE — PROGRESS NOTE ADULT - SUBJECTIVE AND OBJECTIVE BOX
pt s&e  s/p tka  doing well  says pain minimal today  vac dressing in place  legs soft/nt neg janice nvid    plan:  PT  DVT proph  likely DC home today.
54 F s/p left tka pod 1, RR yesterday, most likely vasovagal    MEDICATIONS  (STANDING):  acetaminophen     Tablet .. 650 milliGRAM(s) Oral every 6 hours  aspirin enteric coated 81 milliGRAM(s) Oral every 12 hours  chlorhexidine 2% Cloths 1 Application(s) Topical <User Schedule>  dexAMETHasone     Tablet 2 milliGRAM(s) Oral once  gabapentin 100 milliGRAM(s) Oral three times a day  lisinopril 10 milliGRAM(s) Oral daily  pantoprazole    Tablet 40 milliGRAM(s) Oral before breakfast  polyethylene glycol 3350 17 Gram(s) Oral at bedtime  senna 2 Tablet(s) Oral at bedtime  sodium chloride 0.9%. 1000 milliLiter(s) (75 mL/Hr) IV Continuous <Continuous>    MEDICATIONS  (PRN):  magnesium hydroxide Suspension 30 milliLiter(s) Oral daily PRN Constipation  methocarbamol 500 milliGRAM(s) Oral every 8 hours PRN Muscle Spasm  naloxone 1 mG/mL Injection for Intranasal Use 4 milliGRAM(s) IntraNasal once PRN for somnulence  ondansetron Injectable 4 milliGRAM(s) IV Push every 6 hours PRN Nausea and/or Vomiting  oxyCODONE    IR 5 milliGRAM(s) Oral every 4 hours PRN Moderate Pain (4 - 6)  oxyCODONE    IR 10 milliGRAM(s) Oral every 4 hours PRN Severe Pain (7 - 10)  traMADol 50 milliGRAM(s) Oral every 4 hours PRN Mild Pain (1 - 3)    Pt s/e at bedside, comfortable in bed  c/o pain, improved since yesterday  no CP, SOB, N/V, dizziness  walked in am, no problems with urination    NAD    Vital Signs Last 24 Hrs  T(C): 36.9 (20 Jun 2025 05:23), Max: 37.1 (19 Jun 2025 09:07)  T(F): 98.4 (20 Jun 2025 05:23), Max: 98.8 (19 Jun 2025 09:07)  HR: 66 (20 Jun 2025 05:23) (49 - 76)  BP: 147/83 (20 Jun 2025 05:23) (75/43 - 167/78)  BP(mean): --  RR: 18 (20 Jun 2025 05:23) (14 - 19)  SpO2: 97% (20 Jun 2025 05:23) (94% - 99%)    Parameters below as of 19 Jun 2025 19:00  Patient On (Oxygen Delivery Method): room air    PE  dressing d/c/i  compartments soft  prevena functioning  NVID  foot wwp                          10.7   9.04  )-----------( 162      ( 20 Jun 2025 07:59 )             32.2       06-20    139  |  104  |  15  ----------------------------<  131[H]  4.2   |  25  |  0.9    Ca    9.0      20 Jun 2025 07:59      PT/OT, wbat with walker  pain control  dvt/GI ppx  postop labs wnl  postop abx completed  home meds  discharge planning

## 2025-06-22 NOTE — DISCHARGE NOTE NURSING/CASE MANAGEMENT/SOCIAL WORK - FINANCIAL ASSISTANCE
[facility] provides services at a reduced cost to those who are determined to be eligible through [facility]’s financial assistance program. Information regarding [facility]’s financial assistance program can be found by going to [email or web address] or by calling [phone].

## 2025-06-22 NOTE — CHART NOTE - NSCHARTNOTEFT_GEN_A_CORE
Case discussed with Dr Yvonne Resendiz this AM  - seen by Physical Therapy: cleared for discharge  - spoke to   - discharge home at this time

## 2025-06-23 PROBLEM — I10 ESSENTIAL (PRIMARY) HYPERTENSION: Chronic | Status: ACTIVE | Noted: 2025-06-04

## 2025-06-24 LAB — SURGICAL PATHOLOGY STUDY: SIGNIFICANT CHANGE UP

## 2025-06-27 ENCOUNTER — APPOINTMENT (OUTPATIENT)
Dept: ORTHOPEDIC SURGERY | Facility: CLINIC | Age: 55
End: 2025-06-27

## 2025-06-27 ENCOUNTER — NON-APPOINTMENT (OUTPATIENT)
Age: 55
End: 2025-06-27

## 2025-06-27 PROCEDURE — 99024 POSTOP FOLLOW-UP VISIT: CPT

## 2025-06-30 ENCOUNTER — NON-APPOINTMENT (OUTPATIENT)
Age: 55
End: 2025-06-30

## 2025-07-02 DIAGNOSIS — R00.1 BRADYCARDIA, UNSPECIFIED: ICD-10-CM

## 2025-07-02 DIAGNOSIS — M17.12 UNILATERAL PRIMARY OSTEOARTHRITIS, LEFT KNEE: ICD-10-CM

## 2025-07-02 DIAGNOSIS — R55 SYNCOPE AND COLLAPSE: ICD-10-CM

## 2025-07-02 DIAGNOSIS — I10 ESSENTIAL (PRIMARY) HYPERTENSION: ICD-10-CM

## 2025-07-02 RX ORDER — CELECOXIB 200 MG/1
200 CAPSULE ORAL
Qty: 30 | Refills: 0 | Status: ACTIVE | COMMUNITY
Start: 2025-07-02 | End: 1900-01-01

## 2025-07-02 RX ORDER — TRAMADOL HYDROCHLORIDE 50 MG/1
50 TABLET, COATED ORAL EVERY 8 HOURS
Qty: 21 | Refills: 0 | Status: ACTIVE | COMMUNITY
Start: 2025-07-02 | End: 1900-01-01

## 2025-07-03 ENCOUNTER — APPOINTMENT (OUTPATIENT)
Dept: ULTRASOUND IMAGING | Facility: HOSPITAL | Age: 55
End: 2025-07-03
Payer: COMMERCIAL

## 2025-07-03 ENCOUNTER — OUTPATIENT (OUTPATIENT)
Dept: OUTPATIENT SERVICES | Facility: HOSPITAL | Age: 55
LOS: 1 days | End: 2025-07-03
Payer: COMMERCIAL

## 2025-07-03 DIAGNOSIS — Z98.890 OTHER SPECIFIED POSTPROCEDURAL STATES: Chronic | ICD-10-CM

## 2025-07-03 DIAGNOSIS — Z00.8 ENCOUNTER FOR OTHER GENERAL EXAMINATION: ICD-10-CM

## 2025-07-03 DIAGNOSIS — J44.9 CHRONIC OBSTRUCTIVE PULMONARY DISEASE, UNSPECIFIED: ICD-10-CM

## 2025-07-03 DIAGNOSIS — R06.02 SHORTNESS OF BREATH: ICD-10-CM

## 2025-07-03 PROCEDURE — 93970 EXTREMITY STUDY: CPT

## 2025-07-03 PROCEDURE — 93970 EXTREMITY STUDY: CPT | Mod: 26

## 2025-07-04 DIAGNOSIS — R06.02 SHORTNESS OF BREATH: ICD-10-CM

## 2025-07-04 DIAGNOSIS — J44.9 CHRONIC OBSTRUCTIVE PULMONARY DISEASE, UNSPECIFIED: ICD-10-CM

## 2025-07-18 ENCOUNTER — APPOINTMENT (OUTPATIENT)
Dept: ORTHOPEDIC SURGERY | Facility: CLINIC | Age: 55
End: 2025-07-18
Payer: COMMERCIAL

## 2025-07-18 PROCEDURE — 99024 POSTOP FOLLOW-UP VISIT: CPT

## 2025-07-18 PROCEDURE — 73562 X-RAY EXAM OF KNEE 3: CPT | Mod: LT

## 2025-08-01 ENCOUNTER — APPOINTMENT (OUTPATIENT)
Dept: ORTHOPEDIC SURGERY | Facility: CLINIC | Age: 55
End: 2025-08-01
Payer: COMMERCIAL

## 2025-08-01 DIAGNOSIS — Z96.652 PRESENCE OF LEFT ARTIFICIAL KNEE JOINT: ICD-10-CM

## 2025-08-01 PROCEDURE — 99024 POSTOP FOLLOW-UP VISIT: CPT

## 2025-08-04 ENCOUNTER — APPOINTMENT (OUTPATIENT)
Dept: ORTHOPEDIC SURGERY | Facility: CLINIC | Age: 55
End: 2025-08-04

## 2025-08-04 ENCOUNTER — RX RENEWAL (OUTPATIENT)
Age: 55
End: 2025-08-04

## 2025-08-05 LAB
CRP SERPL-MCNC: 5.9 MG/L
ERYTHROCYTE [SEDIMENTATION RATE] IN BLOOD BY WESTERGREN METHOD: 32 MM/HR
HCT VFR BLD CALC: 38.3 %
HGB BLD-MCNC: 12.5 G/DL
MCHC RBC-ENTMCNC: 28.9 PG
MCHC RBC-ENTMCNC: 32.6 G/DL
MCV RBC AUTO: 88.5 FL
PLATELET # BLD AUTO: 266 K/UL
PMV BLD AUTO: 0 /100 WBCS
PMV BLD: 10.1 FL
RBC # BLD: 4.33 M/UL
RBC # FLD: 12.5 %
WBC # FLD AUTO: 6.14 K/UL

## 2025-08-07 ENCOUNTER — NON-APPOINTMENT (OUTPATIENT)
Age: 55
End: 2025-08-07

## 2025-08-20 ENCOUNTER — APPOINTMENT (OUTPATIENT)
Dept: ORTHOPEDIC SURGERY | Facility: CLINIC | Age: 55
End: 2025-08-20
Payer: COMMERCIAL

## 2025-08-20 DIAGNOSIS — Z96.652 PRESENCE OF LEFT ARTIFICIAL KNEE JOINT: ICD-10-CM

## 2025-08-20 PROCEDURE — 99024 POSTOP FOLLOW-UP VISIT: CPT

## 2025-09-17 ENCOUNTER — APPOINTMENT (OUTPATIENT)
Dept: ORTHOPEDIC SURGERY | Facility: CLINIC | Age: 55
End: 2025-09-17
Payer: COMMERCIAL

## 2025-09-17 DIAGNOSIS — Z96.652 PRESENCE OF LEFT ARTIFICIAL KNEE JOINT: ICD-10-CM

## 2025-09-17 PROCEDURE — 99024 POSTOP FOLLOW-UP VISIT: CPT
